# Patient Record
Sex: FEMALE | Race: WHITE | NOT HISPANIC OR LATINO | Employment: FULL TIME | URBAN - METROPOLITAN AREA
[De-identification: names, ages, dates, MRNs, and addresses within clinical notes are randomized per-mention and may not be internally consistent; named-entity substitution may affect disease eponyms.]

---

## 2022-11-08 DIAGNOSIS — M25.561 RIGHT KNEE PAIN, UNSPECIFIED CHRONICITY: Primary | ICD-10-CM

## 2022-12-15 ENCOUNTER — TRANSFERRED RECORDS (OUTPATIENT)
Dept: HEALTH INFORMATION MANAGEMENT | Facility: CLINIC | Age: 22
End: 2022-12-15

## 2023-03-07 ENCOUNTER — TELEPHONE (OUTPATIENT)
Dept: ORTHOPEDICS | Facility: CLINIC | Age: 23
End: 2023-03-07

## 2023-03-07 NOTE — TELEPHONE ENCOUNTER
Patient was called and scheduled for a virtual follow up with  on 3/21/23.  She had no other questions.

## 2023-03-21 ENCOUNTER — VIRTUAL VISIT (OUTPATIENT)
Dept: ORTHOPEDICS | Facility: CLINIC | Age: 23
End: 2023-03-21
Payer: COMMERCIAL

## 2023-03-21 DIAGNOSIS — M25.361 PATELLAR INSTABILITY OF RIGHT KNEE: Primary | ICD-10-CM

## 2023-03-21 PROCEDURE — 99204 OFFICE O/P NEW MOD 45 MIN: CPT | Mod: VID | Performed by: ORTHOPAEDIC SURGERY

## 2023-03-21 RX ORDER — BUDESONIDE AND FORMOTEROL FUMARATE DIHYDRATE 160; 4.5 UG/1; UG/1
2 AEROSOL RESPIRATORY (INHALATION) 2 TIMES DAILY
COMMUNITY
Start: 2022-11-08 | End: 2023-11-08

## 2023-03-21 NOTE — PROGRESS NOTES
"Reason For Visit:   Chief Complaint   Patient presents with     RECHECK     Follow up right knee. Review MRI  Call [phone]        Primary MD: Shantell Cooper  Referring MD: est    ?  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  Work status?  Full time.  Date of injury: Went to a wedding and was dancing in October 2022 and her knee went out.  Since then it has been monthly    Date of surgery: NA  Type of surgery: NA.  Smoker: No  Request smoking cessation information: No    There were no vitals taken for this visit.    Pain Assessment  Patient Currently in Pain: Yes  0-10 Pain Scale: 2  Primary Pain Location: Knee (Right)  Pain Descriptors: Sore  Alleviating Factors: Pain medication, Rest (tylenol)  Aggravating Factors: Movement, Walking, Stairs      Jordana is a 23 year old who is being evaluated via a billable video visit.  [phone]    How would you like to obtain your AVS? Mail a copy  If the video visit is dropped, the invitation should be resent by: Text to cell phone:   Will anyone else be joining your video visit? No        Video-Visit Details    Type of service:  Video Visit   1   Originating Location (pt. Location): Home    Distant Location (provider location):  Off-site  Platform used for Video Visit: TVSmiles     Video start time 10:46  Video end time: 11:17  Review of MRI and measuring anatomic risk factors: 15 minutes      Ksenia Marie MD  Professor Orthopedic Surgery  [facility]    Subjective    Patient is a 23-year-old female who last saw me in 2015.  At that time she was 1/th1th0th thgthrthathdthethrth in high school.  She presented with recurrent right patellar instability.  She was experiencing what she calls subluxations at that time without a bobo dislocation.  She was playing soccer.  She chose nonoperative management at that time.    The reason for return to seeing me is that she has been experiencing more episodes of what she calls \"subluxations\", " and more importantly she is falling with them.  They come instantly when she makes a turn while at work, or trying to play pickle ball.  At this point she is given up virtually all of her activities except biking.  The other thing that is happening is that her kneecap is catching before it comes back in.  It is always has come back and spontaneously.  She has swelling that is visible, and she has reduced motion.  This lasts a few days.  She believes this is happening at the moment approximately once a month.    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  She lives alone in an apartment but the apartment does have an elevator.  She last did physical therapy in 2020 which was done in [place].  She continues to do club room activities with weight machines.    In talking about knee motion she does feel that her kneecap is jumping as a goes into the groove in early flexion,  on her right side only and not her left.  This also is somewhat new.    Her only comorbidity is asthma for which she is medicated.  She has no DVT risk factors.    MRI was done in [place] and reviewed.  It is most significant for moderate cartilage wear along the medial half of the lateral patella facet starting at the median ridge and extending laterally.  It is on the inferior half only.    Other anatomic instability risk factors are as follows:  Lateral trochlear inclination angle 4 degrees  Lateral patella tilt 26 degrees  Sulcus angle 167 degrees  C/D1.1       I/S1.5  P TI 0.66  Boss:   6.3 mm    Physical exam done in 2015 showed mild knee hyperextension, less than 10 degrees    Assessment: Based on is well is on her current MRI, as well as her history, I believe that she should stabilize his kneecap.  This is because she is having increasing cartilage wear which is likely partly responsible for her swelling, and she is a fracture risk as she is currently falling when her kneecap gives way.    I would  recommend an MPFL reconstruction/lateral retinacular lengthening/trochlear plasty for the surgical procedure.    She lives alone in an apartment in [place], the apartment has an elevator.  However postoperatively she would plan on spending time with her parents in [place], perhaps for up to a month, and at that location she would go to [place] for her physical therapy with Calli.    She would like something done in the near future as she remains quite fearful of her knee as it has even gone out at work.    We will plan on having her see me down in the [facility] with surgery shortly thereafter.  She would likely stay at her parents house which is about 70 minutes north of the [facility].    She also would like the information to be able to sign into my chart.    I would also like to send her my patient information sheets.  This is for MPFL plus trochlear plasty plus surgical timeline for trochlear plasty procedure.  Email:  [email or web address]

## 2023-03-21 NOTE — LETTER
3/21/2023         RE: Jordana Kern  [address] [place] [address]        Dear Colleague,    Thank you for referring your patient, Jordana Kern, to the [facility] [place]. Please see a copy of my visit note below.    Reason For Visit:   Chief Complaint   Patient presents with     RECHECK     Follow up right knee. Review MRI  Call [phone]        Primary MD: Shantell Cooper  Referring MD: est    ?  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  Date of injury: Went to a wedding and was dancing in October 2022 and her knee went out.  Since then it has been monthly    Date of surgery: NA  Type of surgery: NA.  Smoker: No  Request smoking cessation information: No    There were no vitals taken for this visit.    Pain Assessment  Patient Currently in Pain: Yes  0-10 Pain Scale: 2  Primary Pain Location: Knee (Right)  Pain Descriptors: Sore  Alleviating Factors: Pain medication, Rest (tylenol)  Aggravating Factors: Movement, Walking, Stairs      Jordana is a 23 year old who is being evaluated via a billable video visit.  [phone]    How would you like to obtain your AVS? Mail a copy  If the video visit is dropped, the invitation should be resent by: Text to cell phone:   Will anyone else be joining your video visit? No        Video-Visit Details    Type of service:  Video Visit   1   Originating Location (pt. Location): Home    Distant Location (provider location):  Off-site  Platform used for Video Visit: XZERES     Video start time 10:46  Video end time: 11:17  Review of MRI and measuring anatomic risk factors: 15 minutes      Ksenia Marie MD  Professor Orthopedic Surgery  [facility]    Subjective    Patient is a 23-year-old female who last saw me in 2015.  At that time she was 1/th1th0th thgthrthathdthethrth in high school.  She presented with recurrent right patellar instability.  She was  "experiencing what she calls subluxations at that time without a bobo dislocation.  She was playing soccer.  She chose nonoperative management at that time.    The reason for return to seeing me is that she has been experiencing more episodes of what she calls \"subluxations\", and more importantly she is falling with them.  They come instantly when she makes a turn while at work, or trying to play pickle ball.  At this point she is given up virtually all of her activities except biking.  The other thing that is happening is that her kneecap is catching before it comes back in.  It is always has come back and spontaneously.  She has swelling that is visible, and she has reduced motion.  This lasts a few days.  She believes this is happening at the moment approximately once a month.    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  She lives alone in an apartment but the apartment does have an elevator.  She last did physical therapy in 2020 which was done in [place].  She continues to do club room activities with weight machines.    In talking about knee motion she does feel that her kneecap is jumping as a goes into the groove in early flexion,  on her right side only and not her left.  This also is somewhat new.    Her only comorbidity is asthma for which she is medicated.  She has no DVT risk factors.    MRI was done in [place] and reviewed.  It is most significant for moderate cartilage wear along the medial half of the lateral patella facet starting at the median ridge and extending laterally.  It is on the inferior half only.    Other anatomic instability risk factors are as follows:  Lateral trochlear inclination angle 4 degrees  Lateral patella tilt 26 degrees  Sulcus angle 167 degrees  C/D1.1       I/S1.5  P TI 0.66  Boss:   6.3 mm    Physical exam done in 2015 showed mild knee hyperextension, less than 10 degrees    Assessment: Based on is well is on her current MRI, as " well as her history, I believe that she should stabilize his kneecap.  This is because she is having increasing cartilage wear which is likely partly responsible for her swelling, and she is a fracture risk as she is currently falling when her kneecap gives way.    I would recommend an MPFL reconstruction/lateral retinacular lengthening/trochlear plasty for the surgical procedure.    She lives alone in an apartment in [place], the apartment has an elevator.  However postoperatively she would plan on spending time with her parents in [place], perhaps for up to a month, and at that location she would go to [facility] for her physical therapy with Calli.    She would like something done in the near future as she remains quite fearful of her knee as it has even gone out at work.    We will plan on having her see me down in the [facility] with surgery shortly thereafter.  She would likely stay at her parents house which is about 70 minutes north of the [facility].    She also would like the information to be able to sign into my chart.    I would also like to send her my patient information sheets.  This is for MPFL plus trochlear plasty plus surgical timeline for trochlear plasty procedure.  Email:  [email or web address]      Ksenia Marie MD

## 2023-03-22 NOTE — NURSING NOTE
Information sheet for MPFL and Trochleoplasty along with Trochleoplaty surgical timeline sheet was emailed to patient at the email in her chart.

## 2023-03-24 ENCOUNTER — TELEPHONE (OUTPATIENT)
Dept: ORTHOPEDICS | Facility: CLINIC | Age: 23
End: 2023-03-24
Payer: COMMERCIAL

## 2023-03-27 ENCOUNTER — TELEPHONE (OUTPATIENT)
Dept: ORTHOPEDICS | Facility: CLINIC | Age: 23
End: 2023-03-27
Payer: COMMERCIAL

## 2023-03-27 DIAGNOSIS — M25.361 PATELLAR INSTABILITY OF RIGHT KNEE: Primary | ICD-10-CM

## 2023-03-27 NOTE — TELEPHONE ENCOUNTER
Teaching Flowsheet   Relevant Diagnosis:   Teaching Topic: Right Medial patellofemoral ligament reconstuction with allograft,arthroscopy, trochleoplasty, possible lateral retinacular lengthening     Patient would like to participate in post op PT at [facility] in [facility], writer will fax orders and protocols.     Person(s) involved in teaching:   Patient     Motivation Level:  Asks Questions: Yes  Eager to Learn: Yes  Cooperative: Yes  Receptive (willing/able to accept information): Yes  Any cultural factors/[employer] beliefs that may influence understanding or compliance? No  Comments: none     Patient demonstrates understanding of the following:  Reason for the appointment, diagnosis and treatment plan: Yes  Knowledge of proper use of medications and conditions for which they are ordered (with special attention to potential side effects or drug interactions): Yes  Which situations necessitate calling provider and whom to contact: Yes       Teaching Concerns Addressed:   Comments: none     Proper use and care of (medical equip, care aids, etc.): Yes  Nutritional needs and diet plan: Yes  Pain management techniques: Yes  Wound Care: Yes  How and/when to access community resources: Yes     Instructional Materials Used/Given: surgery packet mailed by Jyothi (patient told to call us if not received by end of week), chlorhexidine, stoplight tool     Time spent with patient: 15 minutes.    Lindsey Herrera RN on 3/27/2023 at 3:05 PM

## 2023-03-31 DIAGNOSIS — M25.361 PATELLAR INSTABILITY OF RIGHT KNEE: Primary | ICD-10-CM

## 2023-04-04 ENCOUNTER — MEDICAL CORRESPONDENCE (OUTPATIENT)
Dept: HEALTH INFORMATION MANAGEMENT | Facility: CLINIC | Age: 23
End: 2023-04-04

## 2023-04-04 ENCOUNTER — OFFICE VISIT (OUTPATIENT)
Dept: ORTHOPEDICS | Facility: CLINIC | Age: 23
End: 2023-04-04
Payer: COMMERCIAL

## 2023-04-04 ENCOUNTER — ANCILLARY PROCEDURE (OUTPATIENT)
Dept: CT IMAGING | Facility: CLINIC | Age: 23
End: 2023-04-04
Attending: ORTHOPAEDIC SURGERY
Payer: COMMERCIAL

## 2023-04-04 ENCOUNTER — ANCILLARY PROCEDURE (OUTPATIENT)
Dept: GENERAL RADIOLOGY | Facility: CLINIC | Age: 23
End: 2023-04-04
Attending: ORTHOPAEDIC SURGERY
Payer: COMMERCIAL

## 2023-04-04 VITALS — WEIGHT: 122.8 LBS | BODY MASS INDEX: 23.18 KG/M2 | HEIGHT: 61 IN

## 2023-04-04 DIAGNOSIS — M25.361 PATELLAR INSTABILITY OF RIGHT KNEE: Primary | ICD-10-CM

## 2023-04-04 DIAGNOSIS — M25.361 PATELLAR INSTABILITY OF RIGHT KNEE: ICD-10-CM

## 2023-04-04 PROCEDURE — 73700 CT LOWER EXTREMITY W/O DYE: CPT | Mod: RT | Performed by: RADIOLOGY

## 2023-04-04 PROCEDURE — 73560 X-RAY EXAM OF KNEE 1 OR 2: CPT | Mod: RT | Performed by: RADIOLOGY

## 2023-04-04 PROCEDURE — 99214 OFFICE O/P EST MOD 30 MIN: CPT | Performed by: ORTHOPAEDIC SURGERY

## 2023-04-04 PROCEDURE — 77073 BONE LENGTH STUDIES: CPT | Performed by: SURGERY

## 2023-04-04 NOTE — NURSING NOTE
"Reason For Visit:   Chief Complaint   Patient presents with     RECHECK     Follow up Right patellar instability.  Discuss surgery DOS: 4/13/23 right MPFL trochleoplasty and possible LRL         Primary MD: Shantell Cooper  Referring MD: est     ?  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  Work status?  Full time.  Date of injury: Went to a wedding and was dancing in October 2022 and her knee went out.  Since then it has been monthly     Date of surgery: NA  Type of surgery: NA.  Smoker: No  Request smoking cessation information: No    Ht 1.56 m (5' 1.42\")   Wt 55.7 kg (122 lb 12.8 oz)   BMI 22.89 kg/m      Pain Assessment  Patient Currently in Pain: Denies    "

## 2023-04-04 NOTE — PROGRESS NOTES
Patient is a 23-year-old female who I have seen once virtually and is here onsite.  She is currently a 23-year-old female who I saw when she was in the 11th grade.  At that time she presented with recurrent right patella instability.  At that time surgical options were presented to her and she opted to continue to treat this nonoperatively    In high school she did well.  She was able to play sports in particular soccer..  She has not had numerous bobo dislocating events since that time but she is experiencing what she calls subluxations.  These have been getting more frequent over time or at least she is noticing them more.  They at times create swelling and pain.    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  She has had 5+ surgeries on her left foot but was able to be fairly active in high school and continues with walking and sporting activities.    Her parents live north of [place].  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  She is here for surgical consultation.  Med list was reviewed.  She has no DVT risk factors.    Physical exam reveals a thin woman, BMI compute to 23.  Examination of patient's bilateral hips show asymmetry.  Right side internal rotation to 45 degrees external rotation to 30 left side internal rotation 15 degrees external rotation to 45.  This is without pain    Examination of patient's left nonaffected knee reveals full range of motion 0-1 48.  Kneecap tracks satisfactorily.  No crepitus.  Passive patella mobility 1 quadrant medial translation, 2 quadrants lateral translation.  Both with a firm endpoint.  No apprehension    Examination of patient's right affected knee reveals fullness about the knee without a bobo fluid wave suggestive of synovitis.  Range of motion 0-1 48.  Tracking reveals soft J sign but relocation with crepitus.  Passive patella mobility 1 quadrant medial translation 2+ quadrant lateral  translation.  Positive apprehension sign.    No suggestion of tibial  torsion by exam    Imaging: Long-leg alignment films reveal pelvic tilt with leg length discrepancy measuring approximately 5 mm.  Asymmetry in hip shape with with femoral neck angle 121 right side, 138 left side  Suggestion of limb version by 2 plain films.  Neutral mechanical axis both sides.    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  Left femoral anteversion is 27 degrees.    Tibial torsion on the right is 26 degrees.  Tibial torsion on the left is 28 degrees.    Tibial Tuberosity to Trochlear groove distance: Her right one is  Right: 13 mm.  Left: 10 mm.    Femoral Tibial Rotation:  Right tibia is 9 degrees rotated externally relative to femur.  Left tibia is 9 degrees rotated externally relative to femur.    Impression: 1.  Recurrent right patellar instability with positive J sign  2.  Imaging most significant for a positive boss and no patella alto and no increased lateral tubercle offset  3.  Limb version below the surgical threshold    Plan: Surgical discussion was had including MPFL reconstruction/LRL/trochlear plasty.  She understands this is an outpatient procedure, she will stay overnight as needed.  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  She already has physical therapy set up with Calli at [facility] in [place]    All questions were answered.    Ksenia Marie MD  Professor Orthopedic Surgery  [facility]

## 2023-04-04 NOTE — LETTER
4/4/2023         RE: Jordana Kern  [address] [place] [address]        Dear Colleague,    Thank you for referring your patient, Jordana Kern, to the [facility] [place]. Please see a copy of my visit note below.    Patient is a 23-year-old female who I have seen once virtually and is here onsite.  She is currently a 23-year-old female who I saw when she was in the 11th grade.  At that time she presented with recurrent right patella instability.  At that time surgical options were presented to her and she opted to continue to treat this nonoperatively    In high school she did well.  She was able to play sports in particular soccer..  She has not had numerous bobo dislocating events since that time but she is experiencing what she calls subluxations.  These have been getting more frequent over time or at least she is noticing them more.  They at times create swelling and pain.    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  She has had 5+ surgeries on her left foot but was able to be fairly active in high school and continues with walking and sporting activities.    Her [place] live [place] [facility].  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  She is here for surgical consultation.  Med list was reviewed.  She has no DVT risk factors.    Physical exam reveals a thin woman, BMI compute to 23.  Examination of patient's bilateral hips show asymmetry.  Right side internal rotation to 45 degrees external rotation to 30 left side internal rotation 15 degrees external rotation to 45.  This is without pain    Examination of patient's left nonaffected knee reveals full range of motion 0-1 48.  Kneecap tracks satisfactorily.  No crepitus.  Passive patella mobility 1 quadrant medial translation, 2 quadrants lateral translation.  Both with a firm endpoint.  No apprehension    Examination of patient's  right affected knee reveals fullness about the knee without a bobo fluid wave suggestive of synovitis.  Range of motion 0-1 48.  Tracking reveals soft J sign but relocation with crepitus.  Passive patella mobility 1 quadrant medial translation 2+ quadrant lateral translation.  Positive apprehension sign.    No suggestion of tibial  torsion by exam    Imaging: Long-leg alignment films reveal pelvic tilt with leg length discrepancy measuring approximately 5 mm.  Asymmetry in hip shape with with femoral neck angle 121 right side, 138 left side  Suggestion of limb version by 2 plain films.  Neutral mechanical axis both sides.    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  Left femoral anteversion is 27 degrees.    Tibial torsion on the right is 26 degrees.  Tibial torsion on the left is 28 degrees.    Tibial Tuberosity to Trochlear groove distance: Her right one is  Right: 13 mm.  Left: 10 mm.    Femoral Tibial Rotation:  Right tibia is 9 degrees rotated externally relative to femur.  Left tibia is 9 degrees rotated externally relative to femur.    Impression: 1.  Recurrent right patellar instability with positive J sign  2.  Imaging most significant for a positive boss and no patella alto and no increased lateral tubercle offset  3.  Limb version below the surgical threshold    Plan: Surgical discussion was had including MPFL reconstruction/LRL/trochlear plasty.  She understands this is an outpatient procedure, she will stay overnight as needed.  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  She already has physical therapy set up with Calli at [facility] in [place]    All questions were answered.    Ksenia Marie MD  Professor Orthopedic Surgery  [facility]

## 2023-04-12 ENCOUNTER — ANESTHESIA EVENT (OUTPATIENT)
Dept: SURGERY | Facility: CLINIC | Age: 23
End: 2023-04-12
Payer: COMMERCIAL

## 2023-04-13 ENCOUNTER — ANESTHESIA (OUTPATIENT)
Dept: SURGERY | Facility: CLINIC | Age: 23
End: 2023-04-13
Payer: COMMERCIAL

## 2023-04-13 ENCOUNTER — APPOINTMENT (OUTPATIENT)
Dept: GENERAL RADIOLOGY | Facility: CLINIC | Age: 23
End: 2023-04-13
Attending: ORTHOPAEDIC SURGERY
Payer: COMMERCIAL

## 2023-04-13 ENCOUNTER — HOSPITAL ENCOUNTER (OUTPATIENT)
Facility: CLINIC | Age: 23
Discharge: HOME | End: 2023-04-14
Attending: ORTHOPAEDIC SURGERY | Admitting: ORTHOPAEDIC SURGERY
Payer: COMMERCIAL

## 2023-04-13 DIAGNOSIS — M25.361 PATELLAR INSTABILITY OF RIGHT KNEE: Primary | ICD-10-CM

## 2023-04-13 DIAGNOSIS — Z98.890 S/P RIGHT KNEE SURGERY: ICD-10-CM

## 2023-04-13 PROCEDURE — 250N000011 HC RX IP 250 OP 636

## 2023-04-13 PROCEDURE — 250N000011 HC RX IP 250 OP 636: Performed by: ANESTHESIOLOGY

## 2023-04-13 PROCEDURE — 999N000141 HC STATISTIC PRE-PROCEDURE NURSING ASSESSMENT: Performed by: ORTHOPAEDIC SURGERY

## 2023-04-13 PROCEDURE — 250N000009 HC RX 250

## 2023-04-13 PROCEDURE — 250N000011 HC RX IP 250 OP 636: Performed by: PHYSICIAN ASSISTANT

## 2023-04-13 PROCEDURE — 258N000001 HC RX 258: Performed by: ORTHOPAEDIC SURGERY

## 2023-04-13 PROCEDURE — C1762 CONN TISS, HUMAN(INC FASCIA): HCPCS | Performed by: ORTHOPAEDIC SURGERY

## 2023-04-13 PROCEDURE — 360N000084 HC SURGERY LEVEL 4 W/ FLUORO, PER MIN: Performed by: ORTHOPAEDIC SURGERY

## 2023-04-13 PROCEDURE — 27450 INCISION OF THIGH: CPT | Mod: RT | Performed by: ORTHOPAEDIC SURGERY

## 2023-04-13 PROCEDURE — 370N000017 HC ANESTHESIA TECHNICAL FEE, PER MIN: Performed by: ORTHOPAEDIC SURGERY

## 2023-04-13 PROCEDURE — 27427 RECONSTRUCTION KNEE: CPT | Mod: RT | Performed by: ORTHOPAEDIC SURGERY

## 2023-04-13 PROCEDURE — 250N000013 HC RX MED GY IP 250 OP 250 PS 637: Performed by: PHYSICIAN ASSISTANT

## 2023-04-13 PROCEDURE — 250N000011 HC RX IP 250 OP 636: Performed by: ORTHOPAEDIC SURGERY

## 2023-04-13 PROCEDURE — 250N000009 HC RX 250: Performed by: ORTHOPAEDIC SURGERY

## 2023-04-13 PROCEDURE — 258N000003 HC RX IP 258 OP 636

## 2023-04-13 PROCEDURE — 999N000180 XR SURGERY CARM FLUORO LESS THAN 5 MIN: Mod: TC

## 2023-04-13 PROCEDURE — 272N000001 HC OR GENERAL SUPPLY STERILE: Performed by: ORTHOPAEDIC SURGERY

## 2023-04-13 PROCEDURE — 710N000010 HC RECOVERY PHASE 1, LEVEL 2, PER MIN: Performed by: ORTHOPAEDIC SURGERY

## 2023-04-13 PROCEDURE — 258N000003 HC RX IP 258 OP 636: Performed by: PHYSICIAN ASSISTANT

## 2023-04-13 PROCEDURE — C1713 ANCHOR/SCREW BN/BN,TIS/BN: HCPCS | Performed by: ORTHOPAEDIC SURGERY

## 2023-04-13 DEVICE — BIO-COMP SWVLK 3.5X 15.8MM
Type: IMPLANTABLE DEVICE | Site: KNEE | Status: FUNCTIONAL
Brand: ARTHREX®

## 2023-04-13 DEVICE — GRAFT TENDON GRACILIS 430300: Type: IMPLANTABLE DEVICE | Site: KNEE | Status: FUNCTIONAL

## 2023-04-13 RX ORDER — PROPOFOL 10 MG/ML
INJECTION, EMULSION INTRAVENOUS CONTINUOUS PRN
Status: DISCONTINUED | OUTPATIENT
Start: 2023-04-13 | End: 2023-04-13

## 2023-04-13 RX ORDER — ACETAMINOPHEN 325 MG/1
975 TABLET ORAL EVERY 8 HOURS
Status: DISCONTINUED | OUTPATIENT
Start: 2023-04-13 | End: 2023-04-14 | Stop reason: HOSPADM

## 2023-04-13 RX ORDER — AMOXICILLIN 250 MG
1 CAPSULE ORAL 2 TIMES DAILY
Status: DISCONTINUED | OUTPATIENT
Start: 2023-04-13 | End: 2023-04-14 | Stop reason: HOSPADM

## 2023-04-13 RX ORDER — PROPOFOL 10 MG/ML
INJECTION, EMULSION INTRAVENOUS PRN
Status: DISCONTINUED | OUTPATIENT
Start: 2023-04-13 | End: 2023-04-13

## 2023-04-13 RX ORDER — SODIUM CHLORIDE, SODIUM LACTATE, POTASSIUM CHLORIDE, CALCIUM CHLORIDE 600; 310; 30; 20 MG/100ML; MG/100ML; MG/100ML; MG/100ML
INJECTION, SOLUTION INTRAVENOUS CONTINUOUS
Status: DISCONTINUED | OUTPATIENT
Start: 2023-04-13 | End: 2023-04-13

## 2023-04-13 RX ORDER — METHOCARBAMOL 750 MG/1
750 TABLET, FILM COATED ORAL EVERY 6 HOURS PRN
Status: DISCONTINUED | OUTPATIENT
Start: 2023-04-13 | End: 2023-04-14 | Stop reason: HOSPADM

## 2023-04-13 RX ORDER — BUDESONIDE AND FORMOTEROL FUMARATE DIHYDRATE 160; 4.5 UG/1; UG/1
1 AEROSOL RESPIRATORY (INHALATION) DAILY
Status: DISCONTINUED | OUTPATIENT
Start: 2023-04-14 | End: 2023-04-13

## 2023-04-13 RX ORDER — GLYCOPYRROLATE 0.2 MG/ML
INJECTION, SOLUTION INTRAMUSCULAR; INTRAVENOUS PRN
Status: DISCONTINUED | OUTPATIENT
Start: 2023-04-13 | End: 2023-04-13

## 2023-04-13 RX ORDER — NALOXONE HYDROCHLORIDE 0.4 MG/ML
0.4 INJECTION, SOLUTION INTRAMUSCULAR; INTRAVENOUS; SUBCUTANEOUS
Status: DISCONTINUED | OUTPATIENT
Start: 2023-04-13 | End: 2023-04-14 | Stop reason: HOSPADM

## 2023-04-13 RX ORDER — BUPIVACAINE HYDROCHLORIDE 2.5 MG/ML
INJECTION, SOLUTION INFILTRATION; PERINEURAL PRN
Status: DISCONTINUED | OUTPATIENT
Start: 2023-04-13 | End: 2023-04-13 | Stop reason: HOSPADM

## 2023-04-13 RX ORDER — CEFAZOLIN SODIUM/WATER 2 G/20 ML
2 SYRINGE (ML) INTRAVENOUS SEE ADMIN INSTRUCTIONS
Status: DISCONTINUED | OUTPATIENT
Start: 2023-04-13 | End: 2023-04-13 | Stop reason: HOSPADM

## 2023-04-13 RX ORDER — ONDANSETRON 4 MG/1
4 TABLET, ORALLY DISINTEGRATING ORAL EVERY 30 MIN PRN
Status: DISCONTINUED | OUTPATIENT
Start: 2023-04-13 | End: 2023-04-13

## 2023-04-13 RX ORDER — ONDANSETRON 2 MG/ML
4 INJECTION INTRAMUSCULAR; INTRAVENOUS EVERY 30 MIN PRN
Status: DISCONTINUED | OUTPATIENT
Start: 2023-04-13 | End: 2023-04-13

## 2023-04-13 RX ORDER — TRANEXAMIC ACID 650 MG/1
1950 TABLET ORAL ONCE
Status: COMPLETED | OUTPATIENT
Start: 2023-04-13 | End: 2023-04-13

## 2023-04-13 RX ORDER — DEXMEDETOMIDINE HYDROCHLORIDE 4 UG/ML
INJECTION, SOLUTION INTRAVENOUS
Status: COMPLETED | OUTPATIENT
Start: 2023-04-13 | End: 2023-04-13

## 2023-04-13 RX ORDER — BUPIVACAINE HYDROCHLORIDE 2.5 MG/ML
INJECTION, SOLUTION EPIDURAL; INFILTRATION; INTRACAUDAL
Status: COMPLETED | OUTPATIENT
Start: 2023-04-13 | End: 2023-04-13

## 2023-04-13 RX ORDER — DEXAMETHASONE SODIUM PHOSPHATE 4 MG/ML
INJECTION, SOLUTION INTRA-ARTICULAR; INTRALESIONAL; INTRAMUSCULAR; INTRAVENOUS; SOFT TISSUE PRN
Status: DISCONTINUED | OUTPATIENT
Start: 2023-04-13 | End: 2023-04-13

## 2023-04-13 RX ORDER — FENTANYL CITRATE 50 UG/ML
25-50 INJECTION, SOLUTION INTRAMUSCULAR; INTRAVENOUS
Status: DISCONTINUED | OUTPATIENT
Start: 2023-04-13 | End: 2023-04-13 | Stop reason: HOSPADM

## 2023-04-13 RX ORDER — ASPIRIN 81 MG/1
81 TABLET ORAL 2 TIMES DAILY
Status: DISCONTINUED | OUTPATIENT
Start: 2023-04-13 | End: 2023-04-14 | Stop reason: HOSPADM

## 2023-04-13 RX ORDER — CEFAZOLIN SODIUM 1 G/3ML
1 INJECTION, POWDER, FOR SOLUTION INTRAMUSCULAR; INTRAVENOUS EVERY 8 HOURS
Status: COMPLETED | OUTPATIENT
Start: 2023-04-13 | End: 2023-04-14

## 2023-04-13 RX ORDER — ONDANSETRON 4 MG/1
4 TABLET, ORALLY DISINTEGRATING ORAL EVERY 6 HOURS PRN
Status: DISCONTINUED | OUTPATIENT
Start: 2023-04-13 | End: 2023-04-14 | Stop reason: HOSPADM

## 2023-04-13 RX ORDER — LIDOCAINE 40 MG/G
CREAM TOPICAL
Status: DISCONTINUED | OUTPATIENT
Start: 2023-04-13 | End: 2023-04-14 | Stop reason: HOSPADM

## 2023-04-13 RX ORDER — NALOXONE HYDROCHLORIDE 0.4 MG/ML
0.4 INJECTION, SOLUTION INTRAMUSCULAR; INTRAVENOUS; SUBCUTANEOUS
Status: DISCONTINUED | OUTPATIENT
Start: 2023-04-13 | End: 2023-04-13 | Stop reason: HOSPADM

## 2023-04-13 RX ORDER — ACETAMINOPHEN 325 MG/1
650 TABLET ORAL EVERY 4 HOURS PRN
Status: DISCONTINUED | OUTPATIENT
Start: 2023-04-16 | End: 2023-04-14 | Stop reason: HOSPADM

## 2023-04-13 RX ORDER — ACETAMINOPHEN 325 MG/1
650 TABLET ORAL EVERY 4 HOURS PRN
Qty: 100 TABLET | Refills: 0 | Status: SHIPPED | OUTPATIENT
Start: 2023-04-13

## 2023-04-13 RX ORDER — FENTANYL CITRATE 50 UG/ML
50 INJECTION, SOLUTION INTRAMUSCULAR; INTRAVENOUS EVERY 5 MIN PRN
Status: DISCONTINUED | OUTPATIENT
Start: 2023-04-13 | End: 2023-04-13

## 2023-04-13 RX ORDER — NALOXONE HYDROCHLORIDE 0.4 MG/ML
0.2 INJECTION, SOLUTION INTRAMUSCULAR; INTRAVENOUS; SUBCUTANEOUS
Status: DISCONTINUED | OUTPATIENT
Start: 2023-04-13 | End: 2023-04-13 | Stop reason: HOSPADM

## 2023-04-13 RX ORDER — SODIUM CHLORIDE, SODIUM LACTATE, POTASSIUM CHLORIDE, CALCIUM CHLORIDE 600; 310; 30; 20 MG/100ML; MG/100ML; MG/100ML; MG/100ML
INJECTION, SOLUTION INTRAVENOUS CONTINUOUS
Status: DISCONTINUED | OUTPATIENT
Start: 2023-04-13 | End: 2023-04-14 | Stop reason: HOSPADM

## 2023-04-13 RX ORDER — SODIUM CHLORIDE, SODIUM LACTATE, POTASSIUM CHLORIDE, CALCIUM CHLORIDE 600; 310; 30; 20 MG/100ML; MG/100ML; MG/100ML; MG/100ML
INJECTION, SOLUTION INTRAVENOUS CONTINUOUS PRN
Status: DISCONTINUED | OUTPATIENT
Start: 2023-04-13 | End: 2023-04-13

## 2023-04-13 RX ORDER — EPHEDRINE SULFATE 50 MG/ML
INJECTION, SOLUTION INTRAMUSCULAR; INTRAVENOUS; SUBCUTANEOUS PRN
Status: DISCONTINUED | OUTPATIENT
Start: 2023-04-13 | End: 2023-04-13

## 2023-04-13 RX ORDER — FLUMAZENIL 0.1 MG/ML
0.2 INJECTION, SOLUTION INTRAVENOUS
Status: DISCONTINUED | OUTPATIENT
Start: 2023-04-13 | End: 2023-04-13 | Stop reason: HOSPADM

## 2023-04-13 RX ORDER — BISACODYL 10 MG
10 SUPPOSITORY, RECTAL RECTAL DAILY PRN
Status: DISCONTINUED | OUTPATIENT
Start: 2023-04-13 | End: 2023-04-14 | Stop reason: HOSPADM

## 2023-04-13 RX ORDER — HYDROMORPHONE HYDROCHLORIDE 1 MG/ML
0.4 INJECTION, SOLUTION INTRAMUSCULAR; INTRAVENOUS; SUBCUTANEOUS
Status: DISCONTINUED | OUTPATIENT
Start: 2023-04-13 | End: 2023-04-14 | Stop reason: HOSPADM

## 2023-04-13 RX ORDER — OXYCODONE HYDROCHLORIDE 5 MG/1
5 TABLET ORAL EVERY 4 HOURS PRN
Status: DISCONTINUED | OUTPATIENT
Start: 2023-04-13 | End: 2023-04-14 | Stop reason: HOSPADM

## 2023-04-13 RX ORDER — ALBUTEROL SULFATE 90 UG/1
2 AEROSOL, METERED RESPIRATORY (INHALATION) EVERY 6 HOURS PRN
COMMUNITY

## 2023-04-13 RX ORDER — HYDROMORPHONE HYDROCHLORIDE 1 MG/ML
0.2 INJECTION, SOLUTION INTRAMUSCULAR; INTRAVENOUS; SUBCUTANEOUS
Status: DISCONTINUED | OUTPATIENT
Start: 2023-04-13 | End: 2023-04-14 | Stop reason: HOSPADM

## 2023-04-13 RX ORDER — ONDANSETRON 2 MG/ML
4 INJECTION INTRAMUSCULAR; INTRAVENOUS EVERY 6 HOURS PRN
Status: DISCONTINUED | OUTPATIENT
Start: 2023-04-13 | End: 2023-04-14 | Stop reason: HOSPADM

## 2023-04-13 RX ORDER — CEFAZOLIN SODIUM/WATER 2 G/20 ML
2 SYRINGE (ML) INTRAVENOUS
Status: COMPLETED | OUTPATIENT
Start: 2023-04-13 | End: 2023-04-13

## 2023-04-13 RX ORDER — DEXAMETHASONE SODIUM PHOSPHATE 10 MG/ML
INJECTION, SOLUTION INTRAMUSCULAR; INTRAVENOUS
Status: COMPLETED | OUTPATIENT
Start: 2023-04-13 | End: 2023-04-13

## 2023-04-13 RX ORDER — FENTANYL CITRATE 50 UG/ML
25 INJECTION, SOLUTION INTRAMUSCULAR; INTRAVENOUS EVERY 5 MIN PRN
Status: DISCONTINUED | OUTPATIENT
Start: 2023-04-13 | End: 2023-04-13

## 2023-04-13 RX ORDER — ONDANSETRON 2 MG/ML
INJECTION INTRAMUSCULAR; INTRAVENOUS PRN
Status: DISCONTINUED | OUTPATIENT
Start: 2023-04-13 | End: 2023-04-13

## 2023-04-13 RX ORDER — POLYETHYLENE GLYCOL 3350 17 G/17G
17 POWDER, FOR SOLUTION ORAL DAILY
Status: DISCONTINUED | OUTPATIENT
Start: 2023-04-14 | End: 2023-04-14 | Stop reason: HOSPADM

## 2023-04-13 RX ORDER — LIDOCAINE HYDROCHLORIDE 20 MG/ML
INJECTION, SOLUTION INFILTRATION; PERINEURAL PRN
Status: DISCONTINUED | OUTPATIENT
Start: 2023-04-13 | End: 2023-04-13

## 2023-04-13 RX ORDER — BUPIVACAINE HYDROCHLORIDE 7.5 MG/ML
INJECTION, SOLUTION INTRASPINAL
Status: COMPLETED | OUTPATIENT
Start: 2023-04-13 | End: 2023-04-13

## 2023-04-13 RX ORDER — NALOXONE HYDROCHLORIDE 0.4 MG/ML
0.2 INJECTION, SOLUTION INTRAMUSCULAR; INTRAVENOUS; SUBCUTANEOUS
Status: DISCONTINUED | OUTPATIENT
Start: 2023-04-13 | End: 2023-04-14 | Stop reason: HOSPADM

## 2023-04-13 RX ORDER — OXYCODONE HYDROCHLORIDE 5 MG/1
5-10 TABLET ORAL EVERY 4 HOURS PRN
Qty: 26 TABLET | Refills: 0 | Status: SHIPPED | OUTPATIENT
Start: 2023-04-13

## 2023-04-13 RX ORDER — AMOXICILLIN 250 MG
1-2 CAPSULE ORAL 2 TIMES DAILY
Qty: 30 TABLET | Refills: 0 | Status: SHIPPED | OUTPATIENT
Start: 2023-04-13

## 2023-04-13 RX ORDER — ASPIRIN 81 MG/1
81 TABLET ORAL 2 TIMES DAILY WITH MEALS
Qty: 60 TABLET | Refills: 0 | Status: SHIPPED | OUTPATIENT
Start: 2023-04-13

## 2023-04-13 RX ORDER — OXYCODONE HYDROCHLORIDE 10 MG/1
10 TABLET ORAL EVERY 4 HOURS PRN
Status: DISCONTINUED | OUTPATIENT
Start: 2023-04-13 | End: 2023-04-14 | Stop reason: HOSPADM

## 2023-04-13 RX ORDER — ACETAMINOPHEN 325 MG/1
975 TABLET ORAL ONCE
Status: COMPLETED | OUTPATIENT
Start: 2023-04-13 | End: 2023-04-13

## 2023-04-13 RX ORDER — HYDROMORPHONE HYDROCHLORIDE 1 MG/ML
0.4 INJECTION, SOLUTION INTRAMUSCULAR; INTRAVENOUS; SUBCUTANEOUS EVERY 5 MIN PRN
Status: DISCONTINUED | OUTPATIENT
Start: 2023-04-13 | End: 2023-04-13

## 2023-04-13 RX ORDER — MAGNESIUM HYDROXIDE 1200 MG/15ML
LIQUID ORAL PRN
Status: DISCONTINUED | OUTPATIENT
Start: 2023-04-13 | End: 2023-04-13 | Stop reason: HOSPADM

## 2023-04-13 RX ORDER — FLUTICASONE FUROATE AND VILANTEROL 200; 25 UG/1; UG/1
1 POWDER RESPIRATORY (INHALATION) DAILY
Status: DISCONTINUED | OUTPATIENT
Start: 2023-04-14 | End: 2023-04-14 | Stop reason: HOSPADM

## 2023-04-13 RX ORDER — HYDROMORPHONE HYDROCHLORIDE 1 MG/ML
0.2 INJECTION, SOLUTION INTRAMUSCULAR; INTRAVENOUS; SUBCUTANEOUS EVERY 5 MIN PRN
Status: DISCONTINUED | OUTPATIENT
Start: 2023-04-13 | End: 2023-04-13

## 2023-04-13 RX ADMIN — GLYCOPYRROLATE 0.1 MG: 0.2 INJECTION, SOLUTION INTRAMUSCULAR; INTRAVENOUS at 11:35

## 2023-04-13 RX ADMIN — FENTANYL CITRATE 50 MCG: 50 INJECTION, SOLUTION INTRAMUSCULAR; INTRAVENOUS at 10:33

## 2023-04-13 RX ADMIN — ONDANSETRON 4 MG: 2 INJECTION INTRAMUSCULAR; INTRAVENOUS at 11:13

## 2023-04-13 RX ADMIN — FENTANYL CITRATE 25 MCG: 50 INJECTION, SOLUTION INTRAMUSCULAR; INTRAVENOUS at 13:28

## 2023-04-13 RX ADMIN — PROPOFOL 30 MG: 10 INJECTION, EMULSION INTRAVENOUS at 15:00

## 2023-04-13 RX ADMIN — PROPOFOL 20 MG: 10 INJECTION, EMULSION INTRAVENOUS at 11:27

## 2023-04-13 RX ADMIN — BUPIVACAINE HYDROCHLORIDE 20 ML: 2.5 INJECTION, SOLUTION EPIDURAL; INFILTRATION; INTRACAUDAL at 10:30

## 2023-04-13 RX ADMIN — DEXAMETHASONE SODIUM PHOSPHATE 4 MG: 4 INJECTION, SOLUTION INTRA-ARTICULAR; INTRALESIONAL; INTRAMUSCULAR; INTRAVENOUS; SOFT TISSUE at 11:32

## 2023-04-13 RX ADMIN — Medication 5 MG: at 11:46

## 2023-04-13 RX ADMIN — ASPIRIN 81 MG: 81 TABLET, COATED ORAL at 20:36

## 2023-04-13 RX ADMIN — SENNOSIDES AND DOCUSATE SODIUM 1 TABLET: 50; 8.6 TABLET ORAL at 20:36

## 2023-04-13 RX ADMIN — PROPOFOL 40 MG: 10 INJECTION, EMULSION INTRAVENOUS at 11:25

## 2023-04-13 RX ADMIN — PROPOFOL 150 MCG/KG/MIN: 10 INJECTION, EMULSION INTRAVENOUS at 11:26

## 2023-04-13 RX ADMIN — SODIUM CHLORIDE, POTASSIUM CHLORIDE, SODIUM LACTATE AND CALCIUM CHLORIDE: 600; 310; 30; 20 INJECTION, SOLUTION INTRAVENOUS at 20:37

## 2023-04-13 RX ADMIN — OXYCODONE HYDROCHLORIDE 5 MG: 5 TABLET ORAL at 18:13

## 2023-04-13 RX ADMIN — SODIUM CHLORIDE, POTASSIUM CHLORIDE, SODIUM LACTATE AND CALCIUM CHLORIDE: 600; 310; 30; 20 INJECTION, SOLUTION INTRAVENOUS at 12:30

## 2023-04-13 RX ADMIN — DEXMEDETOMIDINE 20 MCG: 100 INJECTION, SOLUTION, CONCENTRATE INTRAVENOUS at 10:30

## 2023-04-13 RX ADMIN — FENTANYL CITRATE 25 MCG: 50 INJECTION, SOLUTION INTRAMUSCULAR; INTRAVENOUS at 17:21

## 2023-04-13 RX ADMIN — MIDAZOLAM 1 MG: 1 INJECTION INTRAMUSCULAR; INTRAVENOUS at 11:24

## 2023-04-13 RX ADMIN — MIDAZOLAM 1 MG: 1 INJECTION INTRAMUSCULAR; INTRAVENOUS at 12:20

## 2023-04-13 RX ADMIN — LIDOCAINE HYDROCHLORIDE 50 MG: 20 INJECTION, SOLUTION INFILTRATION; PERINEURAL at 11:24

## 2023-04-13 RX ADMIN — ACETAMINOPHEN 975 MG: 325 TABLET ORAL at 09:34

## 2023-04-13 RX ADMIN — FENTANYL CITRATE 25 MCG: 50 INJECTION, SOLUTION INTRAMUSCULAR; INTRAVENOUS at 11:24

## 2023-04-13 RX ADMIN — Medication 2 G: at 11:23

## 2023-04-13 RX ADMIN — MIDAZOLAM 1.5 MG: 1 INJECTION INTRAMUSCULAR; INTRAVENOUS at 10:33

## 2023-04-13 RX ADMIN — SODIUM CHLORIDE, POTASSIUM CHLORIDE, SODIUM LACTATE AND CALCIUM CHLORIDE: 600; 310; 30; 20 INJECTION, SOLUTION INTRAVENOUS at 11:00

## 2023-04-13 RX ADMIN — ONDANSETRON 4 MG: 2 INJECTION INTRAMUSCULAR; INTRAVENOUS at 14:58

## 2023-04-13 RX ADMIN — BUPIVACAINE HYDROCHLORIDE IN DEXTROSE 1.6 ML: 7.5 INJECTION, SOLUTION SUBARACHNOID at 11:30

## 2023-04-13 RX ADMIN — Medication 5 MG: at 11:38

## 2023-04-13 RX ADMIN — DEXAMETHASONE SODIUM PHOSPHATE 2 MG: 10 INJECTION, SOLUTION INTRAMUSCULAR; INTRAVENOUS at 10:30

## 2023-04-13 RX ADMIN — PROPOFOL 40 MG: 10 INJECTION, EMULSION INTRAVENOUS at 12:20

## 2023-04-13 RX ADMIN — FENTANYL CITRATE 25 MCG: 50 INJECTION, SOLUTION INTRAMUSCULAR; INTRAVENOUS at 15:10

## 2023-04-13 RX ADMIN — TRANEXAMIC ACID 1950 MG: 650 TABLET ORAL at 09:35

## 2023-04-13 RX ADMIN — FENTANYL CITRATE 75 MCG: 50 INJECTION, SOLUTION INTRAMUSCULAR; INTRAVENOUS at 12:20

## 2023-04-13 RX ADMIN — CEFAZOLIN SODIUM 1 G: 1 INJECTION, POWDER, FOR SOLUTION INTRAMUSCULAR; INTRAVENOUS at 20:36

## 2023-04-13 ASSESSMENT — ACTIVITIES OF DAILY LIVING (ADL)
ADLS_ACUITY_SCORE: 39
ADLS_ACUITY_SCORE: 35
ADLS_ACUITY_SCORE: 39
ADLS_ACUITY_SCORE: 35
ADLS_ACUITY_SCORE: 39
ADLS_ACUITY_SCORE: 33
ADLS_ACUITY_SCORE: 39
ADLS_ACUITY_SCORE: 35

## 2023-04-13 NOTE — BRIEF OP NOTE
Orthopaedic Surgery Brief Op-Note     Patient: Jordana Kern  : 2000  Date of Service: 2023 3:24 PM    Preoperative Diagnosis: Patellar instability of right knee [M25.361]     Postoperative Diagnosis: same    Procedure: Procedure(s):  Right Medial patellofemoral ligament reconstuction with allograft,arthroscopy, and femoral chondroplasty  trochleoplasty, ACI Cartilage Biposy  possible lateral retinacular lengthening    Surgeon: Surgeon(s) and Role:     * Ksenia Marie MD - Primary     * Radha Sharp PA-C - Assisting     * Katheryn Nuenz MD - Resident - Assisting     * Darlin Starr MD - Resident - Assisting     * Jone Peña MD - Resident - Assisting    Anesthesia: Spinal, adductor block    Estimated Blood Loss: 50 cc    Tourniquet Time: 120 minutes at 250 mmHg     Specimen:    ID Type Source Tests Collected by Time Destination   A :  ACI Biopsy Right Knee Cartilage Tissue Knee, Right OR DOCUMENTATION ONLY Ksenia Marie MD 2023  1:48 PM        Complications: none    Condition: stable    Findings: please see dictated operative note    Plan:    PWB, <50% toe touch weight bearing to operative extremity with hinged knee brace on and locked at 10 degrees and assistive devices as needed    Hinged knee brace is to be set at 10 degrees to 90 degrees; lock at 10 degrees flexion while ambulating; the brace is to be worn at all times except with range of motion exercises and CPM use    CPM to be set at 10 degrees to flexion tolerance with goal of 90 degrees; advance aggressively in 5 degree increments as tolerated by the patient; emphasize full extension about knee    PT as outpatient; an order and PT protocol will be provided to the patient at time of discharge    ADAT    Pain control with orals prn    Bowel prophylaxis with senna-docusate    DVT prophylaxis: ASA + mechanical     Future Appointments   Date Time Provider Department Center   2023  2:40 PM [facility]   5/16/2023  3:20 PM Ksenia Marie MD [facility]       Disposition: patient may be discharged with  once appropriate discharge criteria have been met    I assisted with positioning, prepping and draping, and closure.    Radha Sharp PA-C  Orthopaedic Surgery    Please page me at [phone] with any questions/concerns. If there is no response, if it is a weekend, or if it is during evening hours, please page the orthopaedic surgery resident on call.    Implants:   Implant Name Type Inv. Item Serial No.  Lot No. LRB No. Used Action   GRAFT TENDON GRACILIS [id number] - [id number] Bone/Tissue/Biologic GRAFT TENDON GRACILIS [id number] MUSCULOSKELETAL LEIGH  Right 1 Implanted   IMP ANCHOR ARTHREX BIO-SWIVELOCK 3.5X15.8MM AR-2325BCC - [id number] Metallic Hardware/[place] IMP ANCHOR ARTHREX BIO-SWIVELOCK 3.5X15.8MM AR-2325BCC  ARTHREX [id number] Right 1 Implanted   IMP ANCHOR ARTHREX BIO-SWIVELOCK 3.5X15.8MM AR-2325BCC - [id number] Metallic Hardware/[place] IMP ANCHOR ARTHREX BIO-SWIVELOCK 3.5X15.8MM AR-2325BCC  ARTHREX [id number] Right 1 Implanted   IMP ANCHOR ARTHREX BIO-SWIVELOCK 3.5X15.8MM AR-2325BCC - [id number] Metallic Hardware/[place] IMP ANCHOR ARTHREX BIO-SWIVELOCK 3.5X15.8MM AR-2325BCC  ARTHREX [id number] Right 1 Implanted   IMP ANCHOR ARTHREX BIO-SWIVELOCK 3.5X15.8MM AR-2325BCC - [id number] Metallic Hardware/[place] IMP ANCHOR ARTHREX BIO-SWIVELOCK 3.5X15.8MM AR-2325BCC  ARTHREX [id number] Right 1 Implanted

## 2023-04-13 NOTE — ANESTHESIA POSTPROCEDURE EVALUATION
Patient: Jordana Kern    Procedure: Procedure(s):  Right Medial patellofemoral ligament reconstuction with allograft,arthroscopy, and femoral chondroplasty  trochleoplasty, ACI Cartilage Biposy  possible lateral retinacular lengthening       Anesthesia Type:  Spinal    Note:  Disposition: Admission   Postop Pain Control: Uneventful            Sign Out: Well controlled pain   PONV: No   Neuro/Psych: Uneventful            Sign Out: Acceptable/Baseline neuro status   Airway/Respiratory: Uneventful            Sign Out: Acceptable/Baseline resp. status   CV/Hemodynamics: Uneventful            Sign Out: Acceptable CV status; No obvious hypovolemia; No obvious fluid overload   Other NRE: NONE   DID A NON-ROUTINE EVENT OCCUR? No           Last vitals:  Vitals Value Taken Time   /81 04/13/23 1800   Temp 36.7  C (98  F) 04/13/23 1519   Pulse 52 04/13/23 1809   Resp 10 04/13/23 1809   SpO2 100 % 04/13/23 1806   Vitals shown include unvalidated device data.    Electronically Signed By: Andrez Doan MD  April 13, 2023  6:10 PM

## 2023-04-13 NOTE — ANESTHESIA PROCEDURE NOTES
Adductor canal Procedure Note    Pre-Procedure   Staff -        Anesthesiologist:  Kaveh Cosby MD       Resident/Fellow: Janusz rGeen MD       Performed By: resident       Location: pre-op       Procedure Start/Stop Times: 4/13/2023 10:30 AM and 4/13/2023 10:35 AM       Pre-Anesthestic Checklist: patient identified, IV checked, site marked, risks and benefits discussed, informed consent, monitors and equipment checked, pre-op evaluation, at physician/surgeon's request and post-op pain management  Timeout:       Correct Patient: Yes        Correct Procedure: Yes        Correct Site: Yes        Correct Position: Yes        Correct Laterality: Yes        Site Marked: Yes  Procedure Documentation  Procedure: Adductor canal       Laterality: right       Patient Position: supine       Patient Prep/Sterile Barriers: sterile gloves, mask       Skin prep: Chloraprep       Needle Type: short bevel       Needle Gauge: 21.        Needle Length (millimeters): 110        Ultrasound guided       1. Ultrasound was used to identify targeted nerve, plexus, vascular marker, or fascial plane and place a needle adjacent to it in real-time.       2. Ultrasound was used to visualize the spread of anesthetic in close proximity to the above referenced structure.       3. A permanent image is entered into the patient's record.    Assessment/Narrative         The placement was negative for: blood aspirated, painful injection and site bleeding       Paresthesias: No.       Bolus given via needle. no blood aspirated via catheter.        Secured via.        Insertion/Infusion Method: Single Shot       Complications: none       Injection made incrementally with aspirations every 5 mL.    Medication(s) Administered   Bupivacaine 0.25% PF (Infiltration) - Infiltration   20 mL - 4/13/2023 10:30:00 AM  Dexmedetomidine 4 mcg/mL (Perineural) - Perineural   20 mcg - 4/13/2023 10:30:00 AM  Dexamethasone 10 mg/mL PF (Perineural) -  "Perineural   2 mg - 4/13/2023 10:30:00 AM  Medication Administration Time: 4/13/2023 10:30 AM      FOR [facility] ([facility]) ONLY:   Pain Team Contact information: please page the Pain Team Via [employer]. Search \"Pain\". During daytime hours, please page the attending first. At night please page the resident first.      "

## 2023-04-13 NOTE — ANESTHESIA PROCEDURE NOTES
"Intrathecal catheter Procedure Note    Pre-Procedure   Staff -        Anesthesiologist:  Behrens, Christopher J, MD       Performed By: anesthesiologist       Location: OR       Procedure Start/Stop Times: 4/13/2023 11:30 AM and 4/13/2023 11:34 AM       Pre-Anesthestic Checklist: patient identified, IV checked, risks and benefits discussed, informed consent, monitors and equipment checked, pre-op evaluation, at physician/surgeon's request and post-op pain management  Timeout:       Correct Patient: Yes        Correct Procedure: Yes        Correct Site: Yes        Correct Position: Yes   Procedure Documentation  Procedure: intrathecal catheter       Patient Position: sitting       Skin prep: Chloraprep       Insertion Site: L3-4. (midline approach).       Needle Gauge: 22.        Needle Length (Inches): 3.5        Spinal Needle Type: Pencan       Introducer used       # of attempts: 1 and  # of redirects:     Assessment/Narrative         Paresthesias: No.       CSF fluid: clear.    Medication(s) Administered   0.75% Hyperbaric Bupivacaine (Intrathecal) - Intrathecal   1.6 mL - 4/13/2023 11:30:00 AM  Medication Administration Time: 4/13/2023 11:30 AM      FOR [facility] ([facility]) ONLY:   Pain Team Contact information: please page the Pain Team Via [place]. Search \"Pain\". During daytime hours, please page the attending first. At night please page the resident first.      "

## 2023-04-13 NOTE — DISCHARGE INSTRUCTIONS
Physical Therapy Post-Operative Guidelines  Trochleoplasty      Phase I: 0-4 Weeks   Precautions: Brace locked at 10?; NO OKC at any point; wound healing   Weight Bearing Brace ROM    WBAT with brace locked in 10? KF   Use of crutches for symptom control and reduce stress at trochlea    On & locked at 10? KF at all times except w/CPM or P/AAROM exercises   Discontinue brace for sleep at 4 weeks per comfort level (unless otherwise instructed by MD)   Open when seated  Emphasize full extension   Progress flexion to 90?KF multiple times per day   CPM 4-6 hours per day   Patellar and peripatellar joint and soft tissue mobilizations permitted   Do NOT force into painful flexion (maintain pain control of 3/10 or less with ROM)   Therapeutic Exercise and Activity    Establish high quality quad set         *Superior translation of the patella         *Avoid co-contraction with hamstrings and proximal gluteal musculature         *Utilize NMES as needed    SLR x 4         *Flexion: begin in standing à reclined standing à supine                -Progress per quad control, no extensor lag         *Abduction, Adduction, Extension   Beginner mat exercises for abdominal/lumbopelvic control and proximal hip strength   Calf raises    Standing TKE with resistance band   Isometric leg press (at intervals 40-90? KF) at comfortable level of intensity (not moving the load isotonically)   Goals:   Quad set WNL; Emphasize full knee hyperextension; SLR without lag; full hyperextension-90  ROM; resolve joint effusion     ** For Akbar Grooveplasty - functional return to activity (faster progression likely)  Phase II: 4-8 Weeks   Precautions: Weight bearing progression subject to MD discretion, concomitant procedure precautions, and patient symptom response to activity; Do not progress through increased joint swelling or crepitus - not to be tolerated with protocol progressions   Weight Bearing Brace ROM    PWB à FWB with brace on  locked in extension   No stair climbing with surgical limb    Gradually open brace per quad control   Fully open for ROM exercises   Discontinue brace with sleeping at 4 weeks unless otherwise instructed by MD    Full knee hyperextension   Progress towards full knee flexion ROM (do not force end range knee flexion)     Therapeutic Exercise and Activity (Phase II continued)    Initiate bridging with legs over exercise ball/bolster (no plank poses yet)   Increase reps w/proximal hip/abdom exercises    Initiate basic 2 limb CKC strength drills     *Shallow (0-45?) KF angles for  PFJ stress   Calf raises   Initiate 2 limb L/E proprio/balance    Marching with balance moment   Emphasize terminal knee extension control in CKC   Goals: Effusion resolving; Full extension ROM; Flexion ROM ?120?; Multi-planar L/E hip strength = MMT grade 5/5      Phase III: 8-12 Weeks   Precautions: Proper alignment for squatting activities, no running or impact activities   Weight Bearing Brace ROM    FWB, unless instructed differently by MD         *Normalize gait pattern,            avoiding knee hyperextension            in early stance  Open per quad control   Protective use when out of home: environmental hazards, crowds  Full, symmetrical ROM   Therapeutic Exercise and Activity    Progress core activities - side plank from knees, bridging w/ or w/o ball, basic 2 legged prone plank and hip strength   Initiate basic low impact cardio with bike, elliptical, walking (15-20 minutes, minimal intensity, steady pace)   Progress CKC drills - step, lunge, leg press         *Deeper KF angles (>45?) with 2 legged support         *Early KF angles (0-45?) with 1 legged support per control/tolerance   Progress L/E proprio/balance drills: single limb per control/tolerance   Goals: Effusion resolved; ROM WNL; Progressing toward normal gait pattern in FWB; Able to perform ?30 reps prior to fatigue with leg lifting; Normal LE kinematics w/2 legged CKC  activities     Phase IV: 12-16 Weeks*   Precautions: Observe/instruct proper L/E alignment w/CKC drills (avoid functional valgus); Avoid faulty mechanics that lead to PF issues   Weight Bearing Brace ROM    FWB  No brace per MD  Full ROM   Cardiovascular Proprioception/Balance Core Stability    Progress low impact cardio per symptoms - increase one variable at a time (intensity level, intervals, duration)         *15-20 min minimal            intensity, steady pace to            begin  Progress drills: Add surface challenge/perturbation on DL   Single limb activities on level surface   Directional reaching and stepping drills  Advance progression of core stabilization and bridging as tolerated   Strengthening    Increase workload with CKC drills:         *Add resistance with 2 legged squatting          *Progress depth with single limb (step, lunge, leg press)         *Initiate large muscle group weight training (HS curls, leg press, calf raises, dead lift, etc.)   Goals: Gradual progression back to full non pounding activity based on symptoms response and demonstrated control and strength       *Timeframes in later phases of rehabilitation are estimates only.  Patient may be progressed faster/slower based on their ability to attain goals for each phase.    Phase V (16 weeks+): Gradual progression back to full activity based on symptom response and demonstrated control and strength.  Recreational running and unlimited plyometrics permitted after 4 months when cleared by MD pending radiographic findings. Please reference Return to Sport or High Physical Demand Occupation Protocol for return to further advanced activities for appropriate patients    NOTE: Most patients who undertake this operation have limited sport goals and have not routinely engaged in jumping and pounding sports.  Define patient goals and expectations up front and modify protocol accordingly.      Patient to return to [facility] for physical performance testing at 24 weeks post operation      TROCHLEOPLASTY POST OPERATIVE DISCHARGE INSTRUCTIONS    FOLLOW UP APPOINTMENT  You are scheduled for a post operative wound check with Dr. Marie's clinic approximately two weeks after surgery. At approximately eight weeks after surgery, you will see Dr. Marie in clinic.     Your follow up appointments will be at the location that you regularly see Dr. Marie:    [facility]  [address] [place], [place] [address] [phone]    Physical therapy:   A referral for physical therapy will be made at discharge. You will also receive a physical therapy protocol in your after visit summary. This document must be taken to your first therapy visit.      ACTIVITY  Weight bearing status:   You are allowed to weight bear as tolerated on your operative leg using assistive devices (crutches) as needed. The hinged knee brace should be on and locked at 20 degrees.    Continuous passive motion (CPM) machine:   Perform CPM exercises for six to eight hours per day for the first four weeks after surgery. The CPM should be set at 20 degrees to flexion tolerance with goal of 90 degrees. Advance the CPM settings aggressively in increments of 5 degrees every 30 minutes until desired goal is achieved. After four weeks, the CPM machine can be returned.    Hinged knee brace:  The brace should be set at 20 degrees to 90 degrees. It is to be locked at 20 degrees at all times except with CPM or ROM exercises. The brace is to be worn for three to six weeks following surgery. Sleep with the brace on until directed.    Exercises:   Perform the following exercises at least three times per day for the first four weeks after surgery to prevent complications, such as blood clots in your legs:  1) Point and flex your feet  2) Move your ankle around in big circles  3) Wiggle your toes   Also, perform thigh  muscle tightening exercises for 10 to 15 minutes at least three times per day for the first four weeks after surgery.    Athletic Activities:  Activities such as swimming, bicycling, jogging, running, and stop-and-go sports should be avoided until permitted by your provider.    Driving:  Driving is not permitted until directed by your provider. Typically, driving is restricted for three to four weeks after right knee surgery and three weeks after left knee surgery. Under no circumstance are you permitted to drive while using narcotic pain medications.    Return to Work:  You may return to work when directed by your provider. Typically, patients with desk/sitting jobs can return to work within two weeks while patients with heavy labor jobs can return to work around three months after surgery.      COMFORT AND PAIN MANAGEMENT  Elevation:   During times of inactivity throughout the first two weeks after surgery, make an effort to decrease swelling by elevating your operative extremity. This is most effectively done by lying down and placing several pillows lengthwise under your thigh and calf to raise your toes above the level of your nose. To ensure that your knee remains in full extension, do not place pillows directly under your knee.     Icing:  An ice pack will be provided to control swelling and discomfort after surgery. Place a thin towel on your skin and apply the ice pack overtop. You may apply ice for 20 minutes as often as two times per hour.    Pain Medications:  You will be discharged with acetaminophen (Tylenol) and a narcotic medication for pain management after surgery. Acetaminophen is most effective when it is taken per the schedule outlined by your provider (every four, six, or eight hours as prescribed). You may safely use acetaminophen as prescribed for the first four weeks after surgery provided you do not exceed the maximum daily dose prescribed by your provider (usually 3000 mg - 4000 mg). The  narcotic pain medication should only be taken on an as-needed basis when necessary and should be reserved for severe pain that is not controlled with scheduled acetaminophen. In the first three days following surgery, your symptoms may warrant use of the narcotic pain medication every three, four, or six hours as prescribed. After three days, focus your efforts on decreasing (tapering) use of narcotic medications.   The most successful tapering strategy is to first, decrease the dose (number of tablets) and second, increase the interval (time in between doses). For example, if you begin taking two tablets every four hours after surgery, start your taper by decreasing one of these doses to one tablet. Every one to two days, decrease another dose to one tablet until you are eventually taking one tablet every four hours. Once this is achieved, focus on increasing the number of hours between doses, moving from one tablet every four hours to one tablet every six hours. As tolerated, continue to increase the interval to eight and twelve hours. Eventually, taper to one dose every evening and discontinue when no longer needed.      ANTICOAGULATION  Depending on your risk factors, your provider may prescribe aspirin to prevent blood clots. If prescribed, take aspirin daily for the first four weeks after surgery.      WOUND CARE AND SHOWERING  Wound care:  Remove compression dressing (tubigrip sleeve) twice daily for 10 minutes (to prevent skin breakdown).   Remove the compressive device for showering (see showering instructions below).   Discontinue Compression dressing two days after surgery  A sterile dressing was placed over your surgical incision. This dressing should be kept in place for the first seven days after surgery.   After seven days, remove the dressing and leave the incision open to air, covering only for showering (see showering instructions below).   Your surgical incision was closed with Exofin (a surgical  adhesive that is directly on the incision areas). The Exofin should be left on until it falls off or is removed at your first office visit.   DO NOT pick or scratch your incision.   Please contact Dr. Marie's office if you notice the following:   Significant cloudy, bloody, or malodorous drainage from the incision  Excessive warmth and redness around the incision.    Showering:  You may shower beginning two days after surgery, however, the surgical incision must remain dry until your first office visit.   Always remove the ACE wrap or tubigrip compression sleeve for showering.   During the first seven days after surgery, your surgical dressing will prevent the incision from becoming wet.   Once the surgical dressing is removed, cover the incision with saran wrap (or any other non-permeable covering) to keep the incision dry while showering.  You are strictly prohibited from soaking or submerging the surgical wound underwater.     Tub Bathing:  Tub bathing, swimming, or any other activities that cause your incision to be submerged should be avoided until allowed by your provider. Typically, patients are allowed to return to these activities six weeks after surgery.      CONTACTING YOUR PHYSICIAN:  You may experience symptoms that require follow-up before your scheduled two week appointment. Please contact Dr. Marie's office if you experience:  1) Pain in your knee that persists or worsens in the first few days after surgery  2) Excessive redness or drainage of cloudy or bloody material from the wounds (clear red tinted fluid and some mild drainage should be expected) or drainage of any kind five days after surgery  3) A temperature elevation greater than 101.5 F   4) Pain, swelling or redness in your calf  5) Numbness or weakness in your leg or foot      Regular business hours (Monday - Friday, 8am - 5pm):  [facility]: [phone]    After hours and  weekends:  [facility] on call Orthopedic resident: [phone]

## 2023-04-13 NOTE — OP NOTE
PREOPERATIVE DIAGNOSES:   1. Right knee acute on chronic patellar instability.   2. 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  3. Significant J sign   4.  No patella rosa.   5. Moderate tilt with lateral tightness.   6. Assess cartilage integrity.    POSTOPERATIVE DIAGNOSES:   1. Right  knee acute on chronic patellar instability.   2. Patella w/ wide area of grade 2-3  cartilage wear with a central lesion of bare bone  3. Trochlear groove w/  Small area of grade 2-3 at the most superior-lateral trochlea.  4.  Wide area of bare grade 2-3 cartilage wear encompassing the mid-inferior patella, both facets. ( 24 mm x 12 mm; ht. Xwidth)\  5.  Small area of grade 4 bare bone changes (16 x 12mm)  6.  No significant cartilage or meniscus pathology, tibiofemoral joint.     OPERATIONS:   1. Right knee exam under anesthesia.   2. Diagnostic arthroscopy with patella chondroplasty  3. Trochleoplasty.   4. Lateral retinacular lengthening  6. Medial patellofemoral ligament reconstruction using gracilis allograft.  7. ROBEL cartilage biopsy    OPERATORS: Ksenia Marie  ASSISTANTS: Radha Sharp PA-C  ANESTHESIA:  Spinal supplemented w/ an adductor block medially and a austyn-articular injection of Robivicaine laterally  FINDINGS: Exam under anesthesia revealed range of motion 5/-/130 degrees   While awake, the patient  had  (+) J tracking; under anesthesia hard J tracking to passive range of motion persisted  I could dislocate the patella when asleeep.  Patella showed significant central cartilage wear    APIF  Lateral trochlear inclination angle 4 degrees  Lateral patella tilt 26 degrees  Sulcus angle 167 degrees  C/D1.1       I/S1.5  P TI 0.66  Boss:   6.3 mm    Arthroscopic findings revealed no fluid upon entry into the joint.   The patient's patellofemoral compartment:    Hard J-sign both awake and asleep  Lateral patellar maltracking as viewed arthroscopically.  The patient's medial and  lateral compartment showed pristine cartilage surfaces when visualized and probed and normal menisci.     DESCRIPTION OF PROCEDURE: Under spinal, the patient was positioned supine on the operating room table. The patient's leg was prepped and draped in the usual sterile fashion. A pause was performed identifying the correct leg, the administration of antibiotic,s and appropriate coverage with a lead apron for anticipated use of the fluoroscopy unit.   A diagnostic arthroscopy was performed using an anterolateral portals for visualization, respectively. Diagnostic arthroscopy findings as stated above.   We then made a medial portal, and with a shaver, performed a chondroplasty to the patella, as well as open debridement when open.  At the end of the arthroscopy, excess fluid was removed from the knee. We then elevated the tourniquet to  250 mmHg after Esmarch exsanguination of the leg.   We made a midline incision to enter the skin, a vastus splitting incision to enter the joint.  We confirmed the need for a trochleoplasty by the shape of the trochlear.    We began with lengthening the lateral soft tissue structures.  We divided the vastus lateralis from the ITB, lifting layer one from its insertion on the patella.  We then cut the second layer deep as it inserted into the lateral IM septum, and saw a generous release of the tissue.   We retracted the patella medially and felt we had good visualization of the trochlea from the lateral approach.  We then turned attention back to the trochlea.  We marked the borders of the trochlear cuts on the medial and lateral side of the trochlea.  We began the trochleoplasty proper by making two cortical cuts circumferentially around marked area of the trochlea, creating a window to serve as entry into the undersurface of the trochlea cartilage surface. We whit out our anticipated trochea plasty, defining our native trochlea and what we anticipated would be our new trochlea  groove. This was done with a marking pen on the cartilage surfaces.   Using a combination of  small sharp osteotomes and the Chemclinrex trochleaoplasty griselda of 5mm thickness, we then undercut the trochlear bone. We undermined the cartilage flap to just superior to the inter condyler notch.  Cartilage surfaces were kept moist during this time, generously irrigating this with cool water.  We then checked the flexibility of our osteotomy flap.   We felt that the osteotomy flap was flexible enough that we did not need to do a central osteotomy cut.   Once we had satisfactory morphology to our trochlear cut and we had good apposition between the cartilage surfaces and the distal bone cut, we prepared for our fixation.   Using a 3.5 swivel lock device, we placed a swivel lock device in the deep knee just above the intercondylar notch.  This swivel lock device was threaded with  three #1 PDS sutures .  Once we had satisfactory fixation, we placed 3 swivel lock devices superiorly to the superior extent of the trochlear cartilage standing central, medial, and lateral.  We then bone grafted this with cancellous bone taken previously from the underneath side of the trochlea, placed any remaining bone graft underneath the osteotomy to secure adequate bone coverage underneath.   We then used the PDS suture placed in the superior swivel lock device is to bring the synovium down to the superior cartilage border.   We then prepared to do our MPFL. We brought the patella back to the new groove. There remained lateral tightness. We then performed a lengthening. We then did our lateral retinacular lengthening by removing the iliotibial band from its attachment on to the patella. We were able to find a 2-layer construct and were able to lengthen it approximately 20 mm. This was done extra-articular. We cut the first layer of the lateral retinacular structures close to the bone and the second layer as it inserted into the deep structures  close to the lateral intermuscular septum. We then bent the knee to 60 degrees and sewed the near end of layer 1 to the far end of layer 2.     We inspected the  Patellar again when open; minor cartilage debridement was performed.  We made an intra-operative decision to obtain a ROBEL biopsy for (potential) later use, taking 3 currette strips of cartilage from the intracondylar notch.  We then brought the C-arm in the room. We found the approximate location of the MPFL on the patella and placed a K-wire medial to lateral across the patella. This position was confirmed with C-arm. We then created a 10 mm length, 4 mm wide tunnel or docking station on the medial patella. We then placed a leader suture medial to lateral across the patella.   Through the same  Incision,, we identified the adductor inga insertion onto the adductor tubercle. We then placed a leader suture around this tendon.   During this time a hamstring allograft was brought into the room. We tubularized this to fit a 4 mm tunnel. We then put leader sutures on both ends of the graft.   We then brought the graft up onto the table and using the leader sutures as a guide, we threaded the graft into the medial border of the patella. We secured the graft in 2 ways. The sutures exiting the lateral border of the patella were tied into the soft tissues. As the graft exited the medial border of the patella, we sewed the soft tissue into the graft with a box stitch of #1 Vicryl.   We then brought the graft underneath the medial retinaculum counterclockwise around the adductor inga tendon, back underneath the medial retinaculum to exit at the mid medial portion of the patella.   We placed the knee at 40 degrees of flexion. With the 2 arms of the graft crossed over one another just distal to the adductor inga tendon, we placed a suture of #1 Ethibond in place. This was after we had secured the patella in the groove at 40 degrees of flexion and pulled the graft  snug but not over-taut.   We then brought the knee through a full range of motion and felt we had a good construct with full motion on the table and 2-quadrant passive lateral mobility with a firm endpoint.   We then placed the knee at 20 degrees of flexion and secured the second or distal arm of the graft into the mid medial border of the patella with soft tissue technique. We then placed the knee through a full range of motion. The tourniquet was let down at this point in time.   Turning attention back to the lateral side, we closed the lateral retinaculum from the previous lengthening procedure securing the far end of layer 1 to the near end of layer 2 with #1 Vicryl sutures.  We we try to secure as much capsular closure as we could by using surrounding fat to close any remaining gap  The adductor fascia was closed with figure-of-eight sutures of #1 Vicryl. The medial arthrotomy was then closed with a running #1 Vicryl stitch in the subvastus and superficial vastus fascia. The extensor mechanism arthrotomy itself was closed with figure-of-eight sutures of #1 Vicryl. The adductor fascia was closed with a running #1 Vicryl. Subcutaneous tissue of both incisions was closed with 2-0 Vicryl. The skin was closed with running Monocryl. Steri-Strips, Betadine, Adaptic, a sterile dressing and a Tubigrip stockinette was put in place.  Tourniquet was let down after the MPFL was passed.   Total tourniquet time was 120 min.   The patient's knee was then placed in a locked hinge brace locked at 10 degrees of flexion. The patient will be maintained partial weightbearing on crutches.  Advised to use postop.  The trochlear plasty protocol will be followed.  CPM will be used through a comfortable range of motion starting at 10 degrees and ending at 90 degrees.   The patient was taken to the recovery in satisfactory condition.    Radha Sharp PA-C was a necessary part of the case to help make the MPFL graft, to help with tissue  retraction and limb stabilization during the case.  We had 2 other residents spend partial time with us due to other duties/  There was a resident learner available for the case, but a second set of hands was necessary  to  help manage the limb in the OR, help with tissue retraction to ensure maximum exposure and maximum surgical efficiency, both which promotes best practice and best surgical outcomes.  A PA was an essential part of this case.    Ksenia Marie MD  Professor Orthopedic Surgery  [facility]

## 2023-04-13 NOTE — ANESTHESIA CARE TRANSFER NOTE
Patient: Jordana Kern    Procedure: Procedure(s):  Right Medial patellofemoral ligament reconstuction with allograft,arthroscopy, and femoral chondroplasty  trochleoplasty, ACI Cartilage Biposy  possible lateral retinacular lengthening       Diagnosis: Patellar instability of right knee [M25.361]  Diagnosis Additional Information: No value filed.    Anesthesia Type:   Spinal     Note:    Oropharynx: oropharynx clear of all foreign objects and spontaneously breathing  Level of Consciousness: awake  Oxygen Supplementation: room air    Independent Airway: airway patency satisfactory and stable  Dentition: dentition unchanged  Vital Signs Stable: post-procedure vital signs reviewed and stable  Report to RN Given: handoff report given  Patient transferred to: PACU    Handoff Report: Identifed the Patient, Identified the Reponsible Provider, Reviewed the pertinent medical history, Discussed the surgical course, Reviewed Intra-OP anesthesia mangement and issues during anesthesia, Set expectations for post-procedure period and Allowed opportunity for questions and acknowledgement of understanding      Vitals:  Vitals Value Taken Time   /65 04/13/23 1519   Temp     Pulse 61 04/13/23 1525   Resp 16 04/13/23 1525   SpO2 99 % 04/13/23 1525   Vitals shown include unvalidated device data.    Electronically Signed By: RADHA Johnson CRNA  April 13, 2023  3:27 PM

## 2023-04-14 ENCOUNTER — APPOINTMENT (OUTPATIENT)
Dept: PHYSICAL THERAPY | Facility: CLINIC | Age: 23
End: 2023-04-14
Attending: PHYSICIAN ASSISTANT
Payer: COMMERCIAL

## 2023-04-14 ENCOUNTER — TELEPHONE (OUTPATIENT)
Dept: ORTHOPEDICS | Facility: CLINIC | Age: 23
End: 2023-04-14
Payer: COMMERCIAL

## 2023-04-14 VITALS
BODY MASS INDEX: 23.5 KG/M2 | WEIGHT: 119.71 LBS | HEART RATE: 55 BPM | TEMPERATURE: 96.8 F | SYSTOLIC BLOOD PRESSURE: 104 MMHG | DIASTOLIC BLOOD PRESSURE: 62 MMHG | HEIGHT: 60 IN | OXYGEN SATURATION: 96 % | RESPIRATION RATE: 16 BRPM

## 2023-04-14 LAB — HGB BLD-MCNC: 13.1 G/DL (ref 11.7–15.7)

## 2023-04-14 PROCEDURE — 250N000011 HC RX IP 250 OP 636: Performed by: PHYSICIAN ASSISTANT

## 2023-04-14 PROCEDURE — 97530 THERAPEUTIC ACTIVITIES: CPT | Mod: GP

## 2023-04-14 PROCEDURE — 97161 PT EVAL LOW COMPLEX 20 MIN: CPT | Mod: GP

## 2023-04-14 PROCEDURE — 85018 HEMOGLOBIN: CPT | Performed by: PHYSICIAN ASSISTANT

## 2023-04-14 PROCEDURE — 97116 GAIT TRAINING THERAPY: CPT | Mod: GP

## 2023-04-14 PROCEDURE — 250N000013 HC RX MED GY IP 250 OP 250 PS 637: Performed by: PHYSICIAN ASSISTANT

## 2023-04-14 PROCEDURE — 999N000111 HC STATISTIC OT IP EVAL DEFER

## 2023-04-14 PROCEDURE — 36416 COLLJ CAPILLARY BLOOD SPEC: CPT | Performed by: PHYSICIAN ASSISTANT

## 2023-04-14 RX ORDER — FLUTICASONE FUROATE AND VILANTEROL 200; 25 UG/1; UG/1
1 POWDER RESPIRATORY (INHALATION) DAILY
COMMUNITY
Start: 2023-04-15

## 2023-04-14 RX ORDER — POLYETHYLENE GLYCOL 3350 17 G/17G
17 POWDER, FOR SOLUTION ORAL DAILY
Qty: 10 PACKET | Refills: 0 | Status: SHIPPED | OUTPATIENT
Start: 2023-04-14

## 2023-04-14 RX ORDER — METHOCARBAMOL 750 MG/1
750 TABLET, FILM COATED ORAL EVERY 6 HOURS PRN
Qty: 40 TABLET | Refills: 0 | Status: SHIPPED | OUTPATIENT
Start: 2023-04-14

## 2023-04-14 RX ADMIN — ASPIRIN 81 MG: 81 TABLET, COATED ORAL at 09:20

## 2023-04-14 RX ADMIN — OXYCODONE HYDROCHLORIDE 10 MG: 10 TABLET ORAL at 09:20

## 2023-04-14 RX ADMIN — ACETAMINOPHEN 975 MG: 325 TABLET ORAL at 02:06

## 2023-04-14 RX ADMIN — CEFAZOLIN SODIUM 1 G: 1 INJECTION, POWDER, FOR SOLUTION INTRAMUSCULAR; INTRAVENOUS at 03:46

## 2023-04-14 RX ADMIN — ACETAMINOPHEN 975 MG: 325 TABLET ORAL at 10:44

## 2023-04-14 ASSESSMENT — ACTIVITIES OF DAILY LIVING (ADL)
ADLS_ACUITY_SCORE: 39
ADLS_ACUITY_SCORE: 36
ADLS_ACUITY_SCORE: 19
ADLS_ACUITY_SCORE: 39

## 2023-04-14 NOTE — TELEPHONE ENCOUNTER
Patient's mother was called back and her questions were answers.  They can be found in the AVS from surgery.  She will call back if they have any other questions.

## 2023-04-14 NOTE — PROGRESS NOTES
Orthopaedic Surgery Progress Note   April 14, 2023    Subjective: No acute events overnight. Pain well controlled. Tolerating diet. Voiding spontaneously, 1x straight cath ON. +Flatus, no BM. Denies fever or chills, CP, SOB, numbness or tingling, motor dysfunction or weakness.     Objective: /62   Pulse 55   Temp 96.8  F (36  C)   Resp 16   Ht 1.524 m (5')   Wt 54.3 kg (119 lb 11.4 oz)   LMP 03/30/2023 (Approximate)   SpO2 96%   BMI 23.38 kg/m      General: NAD, alert and oriented, cooperative with exam.   Cardio: RRR, extremities wwp.   Respiratory: Non-labored breathing.  MSK: RLE: Toes wwp, DP 2+, bcr in all toes.  +EHL/FHL/GSC/TA. SILT SP/DP/Sa/Del Angel/T. Dressing c/d/i.     Hemoglobin   Date Value Ref Range Status   04/14/2023 13.1 11.7 - 15.7 g/dL Final   ]  All cultures:  No results for input(s): CULT in the last 168 hours.    Assessment and Plan: Jordana Kern is a 23 year old female with now s/p R knee arthroscopy, trochleoplasty, and MPFL reconstruction on 4/14 with Dr. Marie. Doing well post-operatively.     Ortho Primary    PWB, <50% toe touch weight bearing to operative extremity with hinged knee brace on and locked at 10 degrees and assistive devices as needed    Hinged knee brace is to be set at 10 degrees to 90 degrees; lock at 10 degrees flexion while ambulating; the brace is to be worn at all times except with range of motion exercises and CPM use    CPM to be set at 10 degrees to flexion tolerance with goal of 90 degrees; advance aggressively in 5 degree increments as tolerated by the patient; emphasize full extension about knee    PT as outpatient; an order and PT protocol will be provided to the patient at time of discharge    ADAT    Pain control with orals prn    Bowel prophylaxis with senna-docusate    DVT prophylaxis: ASA + mechanical    Darlin Starr MD  Orthopaedic Surgery Resident, PGY-4

## 2023-04-14 NOTE — PLAN OF CARE
VS: /61 (BP Location: Right arm, Patient Position: Supine)   Pulse 53   Temp (!) 96.3  F (35.7  C) (Oral)   Resp 16   Ht 1.524 m (5')   Wt 54.3 kg (119 lb 11.4 oz)   LMP 03/30/2023 (Approximate)   SpO2 95%   BMI 23.38 kg/m       O2: RA   Output: Continent of bowel and bladder   Last BM:    Activity: A1 Pivot   Up for meals? Yes    Skin: Incision: R knee   Pain: Pain tolerable, did not request pain medications   CMS: Alert and orientedx4, reports numbness in BLE   Dressing: UTV, ACE wrapped   Diet: Regular    LDA: PIV L hand   Equipment: IV pole, capno   Plan: Possible discharge today   Additional Info: Straight cath'ed once during night shift. Able to void once after and bladder scan 150. Continue to monitor bladder.

## 2023-04-14 NOTE — PROGRESS NOTES
[facility]  OUTPATIENT PHYSICAL THERAPY EVALUATION  PLAN OF TREATMENT FOR OUTPATIENT REHABILITATION  (COMPLETE FOR INITIAL CLAIMS ONLY)  Patient's Last Name, First Name, M.I.  YOB: 2000  Jrodana Kern                           Provider's Name  [facility] Medical Record No.  [id number]                             Onset Date:  04/13/23   Start of Care Date:   4/14/23   Type:     _X_PT   ___OT   ___SLP Medical Diagnosis:                 PT Diagnosis:  Impaired functional mobility Visits from SOC:  1     See note for plan of treatment, functional goals and certification details    I CERTIFY THE NEED FOR THESE SERVICES FURNISHED UNDER        THIS PLAN OF TREATMENT AND WHILE UNDER MY CARE     (Physician co-signature of this document indicates review and certification of the therapy plan).              04/14/23 0800   Appointment Info   Signing Clinician's Name / Credentials (PT) Salud Obrien DPT   Rehab Comments (PT) <50% PWB TTWB R LE   Living Environment   People in Home parent(s)   Current Living Arrangements house  (Has apartment, staying at parent's house for recovery)   Home Accessibility stairs to enter home  (Moved stuff to the main level, no stairs inside needed)   Number of Stairs, Main Entrance   (4 stairs garage, 2 big stairs front porch)   Stair Railings, Main Entrance none   Transportation Anticipated family or friend will provide   Living Environment Comments 4 stairs no rails to enter garage, staying at parent's for recovery   Self-Care   Usual Activity Tolerance excellent   Current Activity Tolerance good   Equipment Currently Used at Home none  (Has crutches, walk in shower with built in ledge)   Fall history within last six months no   Activity/Exercise/Self-Care Comment Knee dislocate and lose balance at times; active as a nurse occupationally prior   General Information   Onset of Illness/Injury or Date of Surgery  04/13/23   Referring Physician Radha Sharp PA-C   Patient/Family Therapy Goals Statement (PT) To return home to her parent's   Pertinent History of Current Problem (include personal factors and/or comorbidities that impact the POC) Right Medial patellofemoral ligament reconstuction with allograft,arthroscopy, and femoral chondroplasty  trochleoplasty, ACI Cartilage Biposy  possible lateral retinacular lengthening   Existing Precautions/Restrictions brace worn when out of bed;fall;weight bearing  (ROM in HKB - 10-90 when unlocked, locked at 10* for amb)   Weight-Bearing Status - LUE full weight-bearing   Weight-Bearing Status - RUE full weight-bearing   Weight-Bearing Status - LLE full weight-bearing   Weight-Bearing Status - RLE partial weight-bearing (% in comments)  (<50% PWB TTWB)   General Observations Pt very pleasant and agreeable, motivated to work with PT   Cognition   Cognitive Status Comments Follows cues, asks appropriate questions, verbalizes needs well   Pain Assessment   Patient Currently in Pain Yes, see Vital Sign flowsheet   Integumentary/Edema   Integumentary/Edema   (Does have R LE in wrap with brace on but does have visible edema to R compared to L)   Posture    Posture Not impaired   Range of Motion (ROM)   Range of Motion   (R LE in HKB with limitations of 10-90* in bed and locked at 10* with weight bearing)   Strength (Manual Muscle Testing)   Strength (Manual Muscle Testing)   (L LE WFL for mobility, offloading R LE due to pain and restrictions post-op)   Bed Mobility   Comment, (Bed Mobility) Ind bed mob   Transfers   Comment, (Transfers) SBA transfers with use of FWW   Gait/Stairs (Locomotion)   Comment, (Gait/Stairs) Progress to SBA with FWW   Balance   Balance no deficits were identified   Sensory Examination   Sensory Perception patient reports no sensory changes   Clinical Impression   Criteria for Skilled Therapeutic Intervention Evaluation only  (1x evaluation and treatment)    PT Diagnosis (PT) Impaired functional mobility   Influenced by the following impairments Pain, post-op restrictions/ROM restrictions/WB restrictions   Functional limitations due to impairments Impaired bed mob, transfers, amb, and stairs   Clinical Presentation (PT Evaluation Complexity) Stable/Uncomplicated   Clinical Presentation Rationale Per clinical judgment   Clinical Decision Making (Complexity) low complexity   Planned Therapy Interventions (PT) gait training;stair training;transfer training   Anticipated Equipment Needs at Discharge (PT)   (None, has crutches and FWW)   Risk & Benefits of therapy have been explained evaluation/treatment results reviewed;care plan/treatment goals reviewed;risks/benefits reviewed;current/potential barriers reviewed;participants voiced agreement with care plan;participants included;patient;mother;father   Clinical Impression Comments SBA w/ FWW for all mobility   PT Total Evaluation Time   PT Eval, Low Complexity Minutes (34209) 8   Plan of Care Review   Plan of Care Reviewed With patient;father;mother   Physical Therapy Goals   PT Frequency One time eval and treatment only   PT Predicted Duration/Target Date for Goal Attainment 04/14/23   PT Goals Bed Mobility;Transfers;Gait;Stairs   PT: Bed Mobility Independent;Supine to/from sit;Rolling;Within precautions;Goal Met   PT: Transfers Supervision/stand-by assist;Sit to/from stand;Bed to/from chair;Assistive device;Within precautions;Goal Met   PT: Gait Supervision/stand-by assist;Assistive device;Rolling walker;Within precautions;Greater than 200 feet;Goal Met   PT: Stairs Supervision/stand-by assist;Within precautions;4 stairs;Goal Met   Interventions   Interventions Quick Adds Gait Training;Therapeutic Activity   Therapeutic Activity   Therapeutic Activities: dynamic activities to improve functional performance Minutes (01127) 15   Symptoms Noted During/After Treatment None   Treatment Detail/Skilled Intervention Pt supine  in bed on PT arrival. Discusses that she has her own crutches here but she feels uncoordinated with these and does not feel it was going well. PT brought in FWW and educated on PWB with FWW and pt agrees that she would prefer this. Her mother has one at home she can use. She had questions about WB and managing brace as well as when it could be locked/unlocked so we went over education and orientation to her HKB, she was able to demonstrate successfully locking and unlocking. Family had questions about CPM so we educated on this as well. Educated on the role of outpatient PT and what they will work on for pt as well. Pt was able to demonstrate ind dressing of lower body including undergarment and shorts for her comfort. Educated pt's parents on guarding techniques to assist pt with stairs at home and they deny any concerns with this. Pt and parents denied further questions following education.   Gait Training   Gait Training Minutes (89910) 29   Symptoms Noted During/After Treatment (Gait Training) fatigue;increased pain   Treatment Detail/Skilled Intervention Pt completes sup>sit EOB ind. STS at FWW with SBA and essentially chooses TTWB initially, educated on note of <50% PWB and that she can ease into more as tolerable but to limit to <50% and she verbalized understanding. Pt amb w/ FWW and SBA for 300ft total. Did take brief standing rest breaks to take a break for her hands and shoulders. Amb to PT gym and educated on stair strategies, pt elects to go up and down on her bottom. We tried both strategies of sitting in chair perpedicular to stairs and then pivoting to sit lower onto stair and she did struggle with this, but was eventually successful. We then tried strategy of backing up with FWW to step and then reaching back to sit down on stair and she was more comfortable in this fashion. With placing her arms up on next step she was able to push and clear her bottom to scoot up one step at a time and then with  arms on same step able to clear her bottom to scoot down a step. Able to complete STS at FWW with CGA maintaining precautions well. She was able to finish amb bout w/ FWW back to room and was ind getting back into bed. Pt denies further concerns or PT needs.   PT Discharge Planning   PT Plan DC PT   PT Discharge Recommendation (DC Rec) home;home with assist;home with outpatient physical therapy   PT Rationale for DC Rec Pt has good family support, amb greater than household distances with FWW, completed stair training and educated on several strategies available, pt and family deny further concerns   PT Brief overview of current status SBA w/ transfer and amb w/ FWW, ind bed mob   Total Session Time   Timed Code Treatment Minutes 44   Total Session Time (sum of timed and untimed services) 52

## 2023-04-14 NOTE — PLAN OF CARE
Pt. discharged at 1100 to home, and left with personal belongings. Pt. received complete discharge paperwork and all medications as filled by discharge pharmacy. Pt received and signed for the narcotic medication Oxycodone. Pt. was given times of last dose for all discharge medications in writing on discharge medication sheets. Discharge teaching included narcotic medication, pain management, activity restrictions, dressing changes, and signs and symptoms of infection. Dressing supplies sent home. Pt. to follow up with primary care provider in 10 days,(April 28th). Pt. had no further questions at the time of discharge and no unmet needs were identified. Pt's parents were here for discharge teaching and drove patient home.

## 2023-04-14 NOTE — PROGRESS NOTES
Orthopaedic Surgery Progress Note 04/14/2023    Subjective  POD 1 from the below procedure. No acute events overnight.  Pain well controlled. Denies new numbness, tingling, or weakness.  Tolerating diet without nausea or vomiting.  Voiding spontaneously.    Denies fevers, chills, chest pain, SOB.      Procedure   Right Medial patellofemoral ligament reconstuction with allograft,arthroscopy, and femoral chondroplasty  trochleoplasty, ACI Cartilage Biposy  possible lateral retinacular lengthening  Objective  Temp: (!) 96.3  F (35.7  C) Temp src: Oral BP: 105/61 Pulse: 53   Resp: 16 SpO2: 95 % O2 Device: None (Room air) Oxygen Delivery: 4 LPM    Exam:  Gen: No acute distress, resting comfortably in bed.  Resp: Non-labored breathing  Cardio: Regular rate via peripheral pulse  MSK:      RLE:  - Dressings c/d/I  - Fires Quad, TA, GSC, EHL, FHL  - SILT femoral/tibial/sural/saphenous/DP/SP nerves  - PT/DP pulses 2+, foot wwp        No lab results found in last 7 days.    Invalid input(s): SEDRATE      Assessment: Jordana Kern is a 23 year old female s/p the procedure listed below on 4/13 with Dr. Marie. Doing well.  Right Medial patellofemoral ligament reconstuction with allograft,arthroscopy, and femoral chondroplasty  trochleoplasty, ACI Cartilage Biposy  possible lateral retinacular lengthening  Plan:  Ortho Primary    PWB, <50% toe touch weight bearing to operative extremity with hinged knee brace on and locked at 10 degrees and assistive devices as needed    Hinged knee brace is to be set at 10 degrees to 90 degrees; lock at 10 degrees flexion while ambulating; the brace is to be worn at all times except with range of motion exercises and CPM use    CPM to be set at 10 degrees to flexion tolerance with goal of 90 degrees; advance aggressively in 5 degree increments as tolerated by the patient; emphasize full extension about knee    PT as outpatient; an order and PT protocol will be provided to the patient at time of  discharge    ADAT    Pain control with orals prn    Bowel prophylaxis with senna-docusate    DVT prophylaxis: ASA + mechanical  Consults: PT, OT, appreciate assistance in caring for this patient.  Follow-up: Clinic with Dr. Marie within 2 weeks    Disposition: Pending progress with therapies, pain control on orals, and medical stability, anticipate discharge to home on POD # 1-2.        Thank you,    Jone Peña MD   PGY1  Department of Orthopaedic Surgery      Please page me at [phone] with any questions/concerns. If there is no response, if it is a weekend, or if it is during evening hours then please page the orthopaedic surgery resident on call.     Future Appointments   Date Time Provider Department Center   4/28/2023  2:40 PM Radha Sharp PA-C [facility]   5/16/2023  3:20 PM Ksenia Marie MD [facility]

## 2023-04-14 NOTE — PROGRESS NOTES
PACU to Inpatient Nursing Handoff    Patient Jordana Kern is a 23 year old female who speaks English.   Procedure Procedure(s):  Right Medial patellofemoral ligament reconstuction with allograft,arthroscopy, and femoral chondroplasty  trochleoplasty, ACI Cartilage Biposy  possible lateral retinacular lengthening   Surgeon(s) Primary: Ksenia Marie MD  Assisting: Radha Sharp PA-C  Resident - Assisting: Darlin Starr MD; Katheryn Nunez MD; Only, MD Jone     Allergies   Allergen Reactions     Benadryl [Diphenhydramine] Other (See Comments)     Irritated, wound up       Isolation  No active isolations     Past Medical History   has a past medical history of Motion sickness.    Anesthesia Choice with Block   Dermatome Level Dermatomes Left: L3  Dermatomes Right: L3   Preop Meds acetaminophen (Tylenol) - time given: 0934  TXA - time given: 0935   Nerve block Adductor canal.  Location:right. Med:Total Knee/Hip Cocktail (ropivacaine, epinephrine, ketorolac, morphine). Time given: 1030   Intraop Meds lidocaine 2% (mg)  Total dose:  50 mg    Date/Time Rate/Dose/Volume Action Route Admin User Audit    04/13/23 1124 50 mg Given Intravenous Elenita, Iliana, APRN CRNA       propofol 10 mg/mL (mg)  Total dose:  130 mg    Date/Time Rate/Dose/Volume Action Route Admin User Audit    04/13/23 1125 40 mg Given Intravenous Elenita, Iliana, APRN CRNA     1127 20 mg Given Intravenous Elenita, Iliana, APRN CRNA     1220 40 mg Given Intravenous Lucille Cox APRN CRNA     1500 30 mg Given Intravenous Elentia, Iliana, APRN CRNA       propofol drip mcg/kg/min (mcg/kg/min)  Total dose:  1,281.48 mg Dosing weight:  54.3    Date/Time Rate/Dose/Volume Action Route Admin User Audit    04/13/23 1126 150 mcg/kg/min - 48.87 mL/hr New Bag Intravenous Elenita, Iliana, APRN CRNA     1129 125 mcg/kg/min - 40.725 mL/hr Rate Change Intravenous Elenita, Iliana, APRN CRNA     1132 100 mcg/kg/min - 32.58 mL/hr Rate Change  Intravenous Elenita, Iliana, APRN CRNA     1314 125 mcg/kg/min - 40.725 mL/hr Rate Change Intravenous Elenita, Iliana, APRN CRNA     1445 75 mcg/kg/min - 24.435 mL/hr Rate Change Intravenous Elenita, Iliana, APRN CRNA edited    1501  Stopped Intravenous Elenita, Iliana, APRN CRNA       dexamethasone (DECADRON) 4 mg/mL (mg)  Total dose:  4 mg    Date/Time Rate/Dose/Volume Action Route Admin User Audit    04/13/23 1132 4 mg Given Intravenous Elenita, Iliana, APRN CRNA       ondansetron 2 mg/mL (mg)  Total dose:  8 mg    Date/Time Rate/Dose/Volume Action Route Admin User Audit    04/13/23 1113 4 mg Given Intravenous Elenita, Iliana, APRN CRNA     1458 4 mg Given Intravenous Elenita, Iliana, APRN CRNA       Bupivacaine 0.25% PF (Infiltration) (mL)  Total volume:  20 mL    Date/Time Rate/Dose/Volume Action Route Admin User Audit    04/13/23 1030 20 mL Given Infiltration Janusz Green MD       Dexmedetomidine 4 mcg/mL (Perineural) (mcg)  Total dose:  20 mcg    Date/Time Rate/Dose/Volume Action Route Admin User Audit    04/13/23 1030 20 mcg Given Perineural Janusz Green MD       Dexamethasone 10 mg/mL PF (Perineural) (mg)  Total dose:  2 mg    Date/Time Rate/Dose/Volume Action Route Admin User Audit    04/13/23 1030 2 mg Given Perineural Janusz Green MD       ceFAZolin Sodium (ANCEF) injection 2 g (g)  Total dose:  2 g Dosing weight:  55.7    Date/Time Rate/Dose/Volume Action Route Admin User Audit    04/13/23 1123 2 g Given Intravenous Iliana Kwong APRN CRNA       fentaNYL (PF) (SUBLIMAZE) injection 25-50 mcg (mcg)  Total dose:  150 mcg Dosing weight:  55.7    Date/Time Rate/Dose/Volume Action Route Admin User Audit    04/13/23 1124 25 mcg Given Intravenous Elenita, Iliana, APRN CRNA     1220 75 mcg Given Intravenous Lucille Cox APRN CRNA     1328 25 mcg Given Intravenous Elenita, Iliana, APRN CRNA     1510 25 mcg Given Intravenous Iliana Kwong APRN CRNA       glycopyrrolate 0.2 mg/mL (mg)  Total  dose:  0.1 mg    Date/Time Rate/Dose/Volume Action Route Admin User Audit    04/13/23 1135 0.1 mg Given Intravenous Elenita Iliana APRN CRNA       ePHEDrine 5 mg/mL in NS (mg)  Total dose:  10 mg    Date/Time Rate/Dose/Volume Action Route Admin User Audit    04/13/23 1138 5 mg Given Intravenous Elenita, Iliana, APRN CRNA     1146 5 mg Given Intravenous Elenita, Iliana, APRN CRNA       0.75% Hyperbaric Bupivacaine (Intrathecal) (mL)  Total volume:  1.6 mL    Date/Time Rate/Dose/Volume Action Route Admin User Audit    04/13/23 1130 1.6 mL Given Intrathecal Behrens, Christopher J, MD       midazolam (VERSED) injection 1-2 mg (mg)  Total dose:  2 mg Dosing weight:  55.7    Date/Time Rate/Dose/Volume Action Route Admin User Audit    04/13/23 1124 1 mg Given Intravenous Elenita, Iliana, APRN CRNA     1220 1 mg Given Intravenous Kenny Lucille L, APRN CRNA       LR (mL)  Total volume:  1,700 mL    Date/Time Rate/Dose/Volume Action Route Admin User Audit    04/13/23 1100  New Bag Intravenous ElenitaGianaIliana, APRN CRNA     1150 700 mL Anesthesia Volume Adjustment Intravenous Elenita, Iliana, APRN CRNA     1230 300 mL New Bag Intravenous Kenny Lucille L, APRN CRNA     1340 300 mL Anesthesia Volume Adjustment Intravenous Elenita, Iliana, APRN CRNA     1430 200 mL Anesthesia Volume Adjustment Intravenous Elenita, Iliana, APRN CRNA     1527 200 mL Anesthesia Volume Adjustment Intravenous Elenita, Iliana, APRN CRNA          Local Meds Yes - Local Cocktail (morphine, ropivacaine, epinephrine, Toradol)   Antibiotics cefazolin (Ancef) - last given at 1123     Pain Patient Currently in Pain: denies   PACU meds  fentanyl (Sublimaze): 25 mcg (total dose) last given at 1721   oxycodone (Roxicodone): 5 mg (total dose) last given at 1813    PCA / epidural epidural d/c'd during procedure   Capnography     Telemetry ECG Rhythm: Sinus bradycardia   Inpatient Telemetry Monitor Ordered? No        Labs Glucose No results found for: GLC    Hgb No results  "found for: HGB    INR No results found for: INR   PACU Imaging Not applicable     Wound/Incision Incision/Surgical Site 04/13/23 Right;Anterior Knee (Active)   Incision Assessment UTV 04/13/23 1600   Sudha-Incision Assessment UTV 04/13/23 1519   Incision Drainage Amount None 04/13/23 1600   Dressing Intervention Clean, dry, intact 04/13/23 1600   Number of days: 0       Incision/Surgical Site 04/13/23 Right;Lateral Knee (Active)   Incision Assessment UTV 04/13/23 1600   Sudha-Incision Assessment UTV 04/13/23 1519   Incision Drainage Amount None 04/13/23 1600   Dressing Intervention Clean, dry, intact 04/13/23 1600   Number of days: 0      CMS        Equipment Not applicable   Other LDA       IV Access Peripheral IV 04/13/23 Left Hand (Active)   Site Assessment WDL 04/13/23 1600   Line Status Infusing 04/13/23 1600   Dressing Transparent 04/13/23 1600   Dressing Status clean;dry;intact 04/13/23 1600   Phlebitis Scale 0-->no symptoms 04/13/23 1600   Number of days: 0      Blood Products Not applicable EBL 50 mL   Intake/Output Date 04/13/23 0700 - 04/14/23 0659   Shift 1022-7502 4940-4177 6882-7431 24 Hour Total   INTAKE   I.V. 1500 200  1700   Shift Total(mL/kg) 1500(27.62) 200(3.68)  1700(31.31)   OUTPUT   Urine  850  850   Blood 50   50   Shift Total(mL/kg) 50(0.92) 850(15.65)  900(16.57)   Weight (kg) 54.3 54.3 54.3 54.3      Drains / [place]     Time of void PreOp Time of Void Prior to Procedure: 0915 (04/13/23 0939)    PostOp Straight cath: 850 mL (04/13/23 1650)    Diapered? No   Bladder Scan Bladder Scan Volume (mL): 661 ml (\"greater than\") (04/13/23 1624)   PO    crackers, water and ice chips     Vitals    B/P: 122/81  T: 98.6  F (37  C)    Temp src: Oral  P:  Pulse: (!) 48 (04/13/23 1815)          R: 16  O2:  SpO2: 100 %    O2 Device: None (Room air) (04/13/23 3623)    Oxygen Delivery: 4 LPM (04/13/23 0635)         Family/support present mother and father   Patient belongings     Patient transported on cart   DC " meds/scripts (obs/outpt) Yes, meds   Inpatient Pain Meds Released? Yes       Special needs/considerations None   Tasks needing completion None       Cami Beck RN  ASCOM [id number]

## 2023-04-14 NOTE — TELEPHONE ENCOUNTER
[facility] Call Center    Phone Message:     Pt's mother called, requesting a CALL BACK (before the weekend), to discuss and go over pt's POST OP shower instructions (clarification of instructions is needed). Please CALL pt's mother, as soon as possible. Thank you.    Reason for Call: Other: POST OP Shower Instructions, CALL BACK     Action Taken: Message routed to:  [facility] ([facility]): Team    Travel Screening: Not Applicable

## 2023-04-14 NOTE — PLAN OF CARE
Physical Therapy Discharge Summary    Reason for therapy discharge:    All goals and outcomes met, no further needs identified.    Progress towards therapy goal(s). See goals on Care Plan in [facility] electronic health record for goal details.  Goals met    Therapy recommendation(s):    Continued therapy is recommended.  Rationale/Recommendations:  Pt plans to start outpatient PT on Tues.

## 2023-04-14 NOTE — PHARMACY-ADMISSION MEDICATION HISTORY
Admission Medication History Completed by Pharmacy    See [facility] Admission Navigator for allergy information, preferred outpatient pharmacy, prior to admission medications and immunization status.     Medication history sources:  care everywhere    Pertinent changes made to PTA medication list:  Added: Albuterol  Deleted: Xopenex  Changed: None     Additional medication history information:     Prior to Admission medications    Medication Sig Last Dose Taking? Auth Provider Long Term End Date   acetaminophen (TYLENOL) 325 MG tablet Take 2 tablets (650 mg) by mouth every 4 hours as needed for other (mild pain)  Yes Radha Sharp PA-C     albuterol (PROAIR HFA/PROVENTIL HFA/VENTOLIN HFA) 108 (90 Base) MCG/ACT inhaler Inhale 2 puffs into the lungs every 6 hours as needed for shortness of breath, wheezing or cough  Yes Unknown, Entered By History Yes    aspirin 81 MG EC tablet Take 1 tablet (81 mg) by mouth 2 times daily (with meals)  Yes Radha Sharp PA-C     budesonide-formoterol (SYMBICORT) 160-4.5 MCG/ACT Inhaler Inhale 2 puffs into the lungs 2 times daily 4/13/2023 at 0630 Yes Reported, Patient Yes 11/8/23   oxyCODONE (ROXICODONE) 5 MG tablet Take 1-2 tablets (5-10 mg) by mouth every 4 hours as needed for moderate to severe pain  Yes Radha Sharp PA-C     senna-docusate (SENOKOT-S/PERICOLACE) 8.6-50 MG tablet Take 1-2 tablets by mouth 2 times daily Take while on oral narcotics to prevent or treat constipation.  Yes Radha Sharp PA-C            Date completed: 04/13/23    Medication history completed by:   Angeline Saldana, [facility]  PharmD,BCPS  April 13, 2023

## 2023-04-14 NOTE — PLAN OF CARE
Occupational Therapy: Orders received. Chart reviewed and discussed with care team.? Occupational Therapy not indicated as patient working with PT and mobilizing well, has walk in shower with bench, and support as needed.? Defer discharge recommendations to PT/ortho.? Will complete orders.

## 2023-04-15 NOTE — DISCHARGE SUMMARY
ORTHOPAEDIC SURGERY DISCHARGE SUMMARY     Date of Admission: 4/13/2023  Date of Discharge: 4/14/2023  1:30 PM  Disposition: Home  Staff Physician: No att. providers found  Primary Care Provider: Shantell Cooper    DISCHARGE DIAGNOSIS:  Patellar instability of right knee [M25.361]    PROCEDURES: Procedure(s):  Right Medial patellofemoral ligament reconstuction with allograft,arthroscopy, and patella chondroplasty  trochleoplasty, ACI Cartilage Biposy  lateral retinacular lengthening  on 4/13/2023    BRIEF HISTORY:  This is a 23 year old patient who has been followed in clinic. Please refer to that documentation for full details. Briefly, the patient has a history of right patella instability. The patient has failed conservative treatment of symptoms and desires more definitive intervention. Therefore, after reviewing non-operative and operative options including the risks and benefits associated with each, the patient elected to proceed with the above stated procedure.     HOSPITAL COURSE:    The patient was admitted following the above listed procedures for pain control and rehabilitation. Jordana Kern did well post-operatively. . The patient received routine nursing cares and at the time of discharge was medically stable. Vital signs were stable throughout admission. The patient is tolerating a regular diet and is voiding spontaneously. All PT/OT goals have been met for safe mobility. Pain is now controlled on oral medications which will be available on discharge. Stool softeners have been used while taking pain medications to help prevent constipation. Jordana Kern is deemed medically safe to discharge.     Antibiotics:  Ancef given periop and 24 hours postop.  DVT prophylaxis:  Mechanical  PT Progress:  Has met PT/OT goals for safe mobility.    Pain Meds:  Weaned off all IV pain meds by discharge.  Inpatient Events: No significant events or complications.     PHYSICAL EXAM:    General: NAD, alert and oriented,  cooperative with exam.   Cardio: RRR, extremities wwp.   Respiratory: Non-labored breathing.  MSK: RLE: Toes wwp, DP 2+, bcr in all toes.  +EHL/FHL/GSC/TA. SILT SP/DP/Sa/Del Angel/T. Dressing c/d/i.     FOLLOWUP:    Follow up with Dr. Marie at 2 weeks postoperatively.    Future Appointments   Date Time Provider Department Center   4/28/2023  2:40 PM Radha Sharp PA-C [facility]   5/16/2023  3:20 PM Ksenia Marie MD [facility]       Orthopaedic Surgery appointments are at the [facility] [place] ([address] [place] [address]). Call [phone] to schedule a follow-up appointment at this location with your provider.     PLANNED DISCHARGE ORDERS:    PWB, <50% toe touch weight bearing to operative extremity with hinged knee brace on and locked at 10 degrees and assistive devices as needed    Hinged knee brace is to be set at 10 degrees to 90 degrees; lock at 10 degrees flexion while ambulating; the brace is to be worn at all times except with range of motion exercises and CPM use    CPM to be set at 10 degrees to flexion tolerance with goal of 90 degrees; advance aggressively in 5 degree increments as tolerated by the patient; emphasize full extension about knee    PT as outpatient; an order and PT protocol will be provided to the patient at time of discharge    ADAT    Pain control with orals prn    Bowel prophylaxis with senna-docusate    DVT prophylaxis: ASA + mechanical         Discharge Medication List as of 4/14/2023 10:32 AM      START taking these medications    Details   acetaminophen (TYLENOL) 325 MG tablet Take 2 tablets (650 mg) by mouth every 4 hours as needed for other (mild pain), Disp-100 tablet, R-0, E-Prescribe      aspirin 81 MG EC tablet Take 1 tablet (81 mg) by mouth 2 times daily (with meals), Disp-60 tablet, R-0, E-Prescribe      fluticasone-vilanterol (BREO ELLIPTA) 200-25 MCG/ACT inhaler Inhale 1 puff into the lungs daily, Historical      methocarbamol  "(ROBAXIN) 750 MG tablet Take 1 tablet (750 mg) by mouth every 6 hours as needed for muscle spasms, Disp-40 tablet, R-0, E-Prescribe      oxyCODONE (ROXICODONE) 5 MG tablet Take 1-2 tablets (5-10 mg) by mouth every 4 hours as needed for moderate to severe pain, Disp-26 tablet, R-0, E-Prescribe      polyethylene glycol (MIRALAX) 17 g packet Take 17 g by mouth daily, Disp-10 packet, R-0, E-Prescribe      senna-docusate (SENOKOT-S/PERICOLACE) 8.6-50 MG tablet Take 1-2 tablets by mouth 2 times daily Take while on oral narcotics to prevent or treat constipation., Disp-30 tablet, R-0, E-PrescribeWhile taking narcotics         CONTINUE these medications which have NOT CHANGED    Details   albuterol (PROAIR HFA/PROVENTIL HFA/VENTOLIN HFA) 108 (90 Base) MCG/ACT inhaler Inhale 2 puffs into the lungs every 6 hours as needed for shortness of breath, wheezing or cough, Historical      budesonide-formoterol (SYMBICORT) 160-4.5 MCG/ACT Inhaler Inhale 2 puffs into the lungs 2 times daily, Historical               Discharge Procedure Orders   Referral Order to Outpatient Physical Therapy   Standing Status: Future   Referral Priority: Routine Referral Type: Rehab Therapy Physical Therapy   Number of Visits Requested: 1     Reason for your hospital stay   Order Comments: Right knee surgery.     When to call - Contact Surgeon Team   Order Comments: You may experience symptoms that require follow-up before your scheduled appointment. Refer to the \"Stoplight Tool\" for instructions on when to contact your Surgeon Team if you are concerned about pain control, blood clots, constipation, or if you are unable to urinate.     When to call - Reach out to Urgent Care   Order Comments: If you are not able to reach your Surgeon Team and you need immediate care, go to the Orthopedic Walk-in Clinic or Urgent Care at your Surgeon's office.  Do NOT go to the Emergency Room unless you have shortness of breath, chest pain, or other signs of a medical " emergency.     When to call - Reasons to Call 911   Order Comments: Call 911 immediately if you experience sudden-onset chest pain, arm weakness/numbness, slurred speech, or shortness of breath     Discharge Instruction - Breathing exercises   Order Comments: Perform breathing exercises using your Incentive Spirometer 10 times per hour while awake for 2 weeks.     Symptoms - Fever Management   Order Comments: A low grade fever can be expected after surgery.  Use acetaminophen (TYLENOL) as needed for fever management.  Contact your Surgeon Team if you have a fever greater than 101.5 F, chills, and/or night sweats.     Symptoms - Constipation management   Order Comments: Constipation (hard, dry bowel movements) is expected after surgery due to the combination of being less active, the anesthetic, and the opioid pain medication.  You can do the following to help reduce constipation:  ~  FLUIDS:  Drink clear liquids (water or Gatorade), or juice (apple/prune).  ~  DIET:  Eat a fiber rich diet.    ~  ACTIVITY:  Get up and move around several times a day.  Increase your activity as you are able.  MEDICATIONS:  Reduce the risk of constipation by starting medications before you are constipated.  You can take Miralax   (1 packet as directed) and/or a stool softener (Senokot 1-2 tablets 1-2 times a day).  If you already have constipation and these medications are not working, you can get magnesium citrate and use as directed.  If you continue to have constipation you can try an over the counter suppository or enema.  Call your Surgeon Team if it has been greater than 3 days since your last bowel movement.     Symptoms - Reduced Urine Output   Order Comments: Changes in the amount of fluids you drank before and after surgery may result in problems urinating.  It is important to stay well-hydrated after surgery and drink plenty of water. If it has been greater than 8 hours since you have urinated despite drinking plenty of  water, call your Surgeon Team.     Activity - Exercises to prevent blood clots   Order Comments: Unless otherwise directed by your Surgeon team, perform the following exercises at least three times per day for the first four weeks after surgery to prevent blood clots in your legs: 1) Point and flex your feet (Ankle Pumps), 2) Move your ankle around in big circles, 3) Wiggle your toes, 4) Walk, even for short distances, several times a day, will help decrease the risk of blood clots.     Order Specific Question Answer Comments   Is discharge order? Yes      Comfort and Pain Management - Pain after Surgery   Order Comments: Pain after surgery is normal and expected.  You will have some amount of pain for several weeks after surgery.  Your pain will improve with time.  There are several things you can do to help reduce your pain including: rest, ice, elevation, and using pain medications as needed. Contact your Surgeon Team if you have pain that persists or worsens after surgery despite rest, ice, elevation, and taking your medication(s) as prescribed. Contact your Surgeon Team if you have new numbness, tingling, or weakness in your operative extremity.     Comfort and Pain Management - Swelling after Surgery   Order Comments: Swelling and/or bruising of the surgical extremity is common and may persist for several months after surgery. In addition to frequent icing and elevation, gentle compressive support with an ACE wrap or tubigrip may help with swelling. Apply compression regularly, removing at least twice daily to perform skin checks. Contact your Surgeon Team if your swelling increases and is NOT associated with an increase in your activity level, or if your swelling increases and is associated with redness and pain.     Comfort and Pain Management - Cold therapy   Order Comments: Ice can be used to control swelling and discomfort after surgery. Place a thin towel over your operative site and apply the ice pack  overtop. Leave ice pack in place for 20 minutes, then remove for 20 minutes. Repeat this 20 minutes on/20 minutes off routine as often as tolerated.     Medication Instructions - Acetaminophen (TYLENOL) Instructions   Order Comments: You were discharged with acetaminophen (TYLENOL) for pain management after surgery. Acetaminophen most effectively manages pain symptoms when it is taken on a schedule without missing doses (every four, six, or eight hours). Your Provider will prescribe a safe daily dose between 3000 - 4000 mg.  Do NOT exceed this daily dose. Most patients use acetaminophen for pain control for the first four weeks after surgery.  You can wean from this medication as your pain decreases.     Medication Instructions - NSAID Instructions   Order Comments: You were discharged with an anti-inflammatory medication for pain management to use in combination with acetaminophen (TYLENOL) and the narcotic pain medication.  Take this medication exactly as directed.  You should only take one anti-inflammatory at a time.  Some common anti-inflammatories include: ibuprofen (ADVIL, MOTRIN), naproxen (ALEVE, NAPROSYN), celecoxib (CELEBREX), meloxicam (MOBIC), ketorolac (TORADOL).  Take this medication with food and water.     Medication Instructions - Opioids - Tapering Instructions   Order Comments: In the first three days following surgery, your symptoms may warrant use of the narcotic pain medication every four to six hours as prescribed. This is normal. As your pain symptoms improve, focus your efforts on decreasing (tapering) use of narcotic medications. The most successful tapering strategy is to first, decrease the number of tablets you take every 4-6 hours to the minimum prescribed. Then, increase the amount of time between doses.  For example:  First, taper to   or 1 tablet every 4-6 hours.  Then, taper to   or 1 tablet every 6-8 hours.  Then, taper to   or 1 tablet every 8-10 hours.  Then, taper to   or 1  "tablet every 10-12 hours.  Then, taper to   or 1 tablet at bedtime.  The bedtime dose can help with comfort during sleep and is typically the last dose to be discontinued after surgery.     Follow Up Care   Order Comments: Follow-up with your Surgeon Team in 2 weeks for wound check and 4 weeks for recheck with Dr. Marie.     Medication instructions -  Anticoagulation - aspirin   Order Comments: Take the aspirin as prescribed for a total of four weeks after surgery.  This is given to help minimize your risk of blood clot.     Comfort and Pain Management - LOWER Extremity Elevation   Order Comments: Swelling is expected for several months after surgery. This type of swelling is usually associated with gravity and activity, and can be improved with elevation.   The best way to do this is to get your \"toes above your nose\" by laying down and placing several pillows lengthwise under your calf and heel. When elevating your leg keep your knee completely straight. Perform this elevation as often as possible especially for the first two weeks after surgery.     Medication Instructions - Opioid Instructions (Less than 65 years)   Order Comments: You were discharged with an opioid medication (hydromorphone, oxycodone, hydrocodone, or tramadol). This medication should only be taken for breakthrough pain that is not controlled with acetaminophen (TYLENOL). If you rate your pain less than 3 you do not need this medication.  Pain rating 0-3:  You do not need this medication.  Pain rating 4-6:  Take 1 tablet every 4-6 hours as needed  Pain rating 7-10:  Take 2 tablets every 4-6 hours as needed.  Do not exceed 6 tablets per day     Return to Driving   Order Comments: Return to driving - Driving is NOT permitted until directed by your provider. Under no circumstance are you permitted to drive while using narcotic pain medications.     Order Specific Question Answer Comments   Is discharge order? Yes      Dressing / Wound Care - " Wound   Order Comments: You have a clean dressing on your surgical wound. Dressing change instructions as follows:   Leave dressing in place for ten days following surgery. May shower over dressing starting postop day one. May remove clear dressing and silver pads after ten days and shower directely over incision thereafter. Leave white tape strips in place until incision check in clinic. IF leaking, remove and redress. Wash hands before and after and use gloves. Call Orthopedic clinic for assistance if this occurs.  Contact your Surgeon Team if you have increased redness, warmth around the surgical wound, and/or drainage from the surgical wound.     Dressing / Wound Care - NO Tub Bathing   Order Comments: Tub bathing, swimming, or any other activities that will cause your incision to be submerged in water should be avoided until allowed by your Surgeon.     Activity     Order Specific Question Answer Comments   Is discharge order? Yes      Partial weight bearing   Order Comments: Partial weight bearing on your operative extremity. <50% weightbearing with hinged knee brace locked in 10 degrees of flexion.     Order Specific Question Answer Comments   Is discharge order? Yes      Dressing Wound Care - Shower with wound/dressing NOT covered   Order Comments: You do not need to cover your dressing or incision in the shower, you may allow water and soap to run over top of the surgical dressing or incision. You may shower 2 days after surgery.  You are strictly prohibited from soaking or submerging the surgical wound underwater.     Crutches DME   Order Comments: DME Documentation: Describe the reason for need to support medical necessity: Impaired gait status post knee surgery. I, the undersigned, certify that the above prescribed supplies are medically necessary for this patient and is both reasonable and necessary in reference to accepted standards of medical practice in the treatment of this patient's condition and  is not prescribed as a convenience.     Order Specific Question Answer Comments   DME Provider: [facility]    Crutch Type: Standard    Crutches Add On: NA    Length of Need: Lifetime      Cane DME   Order Comments: DME Documentation: Describe the reason for need to support medical necessity: Impaired gait status post knee surgery. I, the undersigned, certify that the above prescribed supplies are medically necessary for this patient and is both reasonable and necessary in reference to accepted standards of medical practice in the treatment of this patient's condition and is not prescribed as a convenience.     Order Specific Question Answer Comments   DME Provider: [facility]    Cane Type: Single Tip    Reminder: Patient can typically get 1 every 5 years      Walker DME   Order Comments: DME Documentation: Describe the reason for need to support medical necessity: Impaired gait status post knee surgery. I, the undersigned, certify that the above prescribed supplies are medically necessary for this patient and is both reasonable and necessary in reference to accepted standards of medical practice in the treatment of this patient's condition and is not prescribed as a convenience.     Order Specific Question Answer Comments   DME Provider: [facility]    Walker Type: Standard (2 Wheel)    Accessories: N/A      Discharge Instruction - Regular Diet Adult   Order Comments: Return to your pre-surgery diet unless instructed otherwise     Order Specific Question Answer Comments   Is discharge order? Yes            Thank you,    Jone Peña MD   PGY1  Department of Orthopaedic Surgery

## 2023-04-18 ENCOUNTER — TELEPHONE (OUTPATIENT)
Dept: ORTHOPEDICS | Facility: CLINIC | Age: 23
End: 2023-04-18
Payer: COMMERCIAL

## 2023-04-19 ENCOUNTER — DOCUMENTATION ONLY (OUTPATIENT)
Dept: ORTHOPEDICS | Facility: CLINIC | Age: 23
End: 2023-04-19
Payer: COMMERCIAL

## 2023-04-19 NOTE — PROGRESS NOTES
Received Completed forms Yes   Faxed Forms Faxed To: [facility]  Fax Number: [phone]   Sent to [facility] (Date) 4/18/23

## 2023-04-22 ENCOUNTER — HEALTH MAINTENANCE LETTER (OUTPATIENT)
Age: 23
End: 2023-04-22

## 2023-04-27 ENCOUNTER — TRANSFERRED RECORDS (OUTPATIENT)
Dept: HEALTH INFORMATION MANAGEMENT | Facility: CLINIC | Age: 23
End: 2023-04-27
Payer: COMMERCIAL

## 2023-04-28 ENCOUNTER — OFFICE VISIT (OUTPATIENT)
Dept: ORTHOPEDICS | Facility: CLINIC | Age: 23
End: 2023-04-28
Payer: COMMERCIAL

## 2023-04-28 DIAGNOSIS — Z98.890 S/P KNEE SURGERY: Primary | ICD-10-CM

## 2023-04-28 PROCEDURE — 99024 POSTOP FOLLOW-UP VISIT: CPT | Performed by: PHYSICIAN ASSISTANT

## 2023-04-28 NOTE — LETTER
4/28/2023         RE: Jordana Kern  [address] [place] [facility] [address]        Dear Colleague,    Thank you for referring your patient, Jordana Kern, to the [facility] [place]. Please see a copy of my visit note below.    ASSESSMENT/PLAN:  Jordana Kern is a 23 year old who is status post right knee MPFL, arthroscopy, throchleoplasty, patella chondroplasty, ACI cartilage biopsy, an LRL  with Dr. Marie on 4/13/23.    The incision was examined and cleansed today. monocryl suture ends trimmed. Glue partially removed. The patient was given instructions to refrain from submerging in a tub or a pool until it is a well-healed scar. They may shower and pat dry with gentle soapy lather. She will continue PT for trochleoplasty protocol. She may WBAT in the HKB locked at 10 degrees. Motion 0-90 for first 4 weeks from surgery, no forceful flexion. She was given another copy of the trochleoplasty protocol. Continue consistent ice and compression for postop knee effusion.     The patient will see Dr. Marie as scheduled on 5/9/23. Jordana has our clinic number and will call with any questions or concerns.    Radha Sharp PA-C  Orthopaedic Surgery    _________________________________    HISTORY OF PRESENT ILLNESS:  Jordana Kern is a 23 year old female who is approximately 2 weeks status post the above procedure.    Weight bearing status/devices: WBAT in HKB  Pain level and management: pain is 1/10, currently using APAP/Ibuprofen. Has weaned from oxycodone over last 4 days.   Physical therapy & ROM: PT at [facility] in [place], working with Calli.      The patient endorses swelling around the surgical incision but denies surrounding redness. The incision has been dry, without discharge or drainage. Jordana denies recent fevers and chills, as well as any other symptoms concerning for infection.     DVT prophylaxis: ASA 162mg daily.  Patient denies calf pain or tenderness.      MEDICATIONS:    Current Outpatient Rx   Medication Sig Dispense Refill    acetaminophen (TYLENOL) 325 MG tablet Take 2 tablets (650 mg) by mouth every 4 hours as needed for other (mild pain) 100 tablet 0    albuterol (PROAIR HFA/PROVENTIL HFA/VENTOLIN HFA) 108 (90 Base) MCG/ACT inhaler Inhale 2 puffs into the lungs every 6 hours as needed for shortness of breath, wheezing or cough      aspirin 81 MG EC tablet Take 1 tablet (81 mg) by mouth 2 times daily (with meals) 60 tablet 0    budesonide-formoterol (SYMBICORT) 160-4.5 MCG/ACT Inhaler Inhale 2 puffs into the lungs 2 times daily      fluticasone-vilanterol (BREO ELLIPTA) 200-25 MCG/ACT inhaler Inhale 1 puff into the lungs daily      methocarbamol (ROBAXIN) 750 MG tablet Take 1 tablet (750 mg) by mouth every 6 hours as needed for muscle spasms 40 tablet 0    oxyCODONE (ROXICODONE) 5 MG tablet Take 1-2 tablets (5-10 mg) by mouth every 4 hours as needed for moderate to severe pain 26 tablet 0    polyethylene glycol (MIRALAX) 17 g packet Take 17 g by mouth daily 10 packet 0    senna-docusate (SENOKOT-S/PERICOLACE) 8.6-50 MG tablet Take 1-2 tablets by mouth 2 times daily Take while on oral narcotics to prevent or treat constipation. 30 tablet 0         ALLERGIES: Benadryl [diphenhydramine]       PHYSICAL EXAMINATION:   On physical examination the patient is comfortable and is in no acute distress. The affect is appropriate and breathing is non-labored.    Surgical wound: The surgical wound was exposed and found to be clean and dry, without drainage or discharge. It is well-approximated. There is mild edema around the incision. There is no erythema. The skin was appropriately warm to touch. Presence of moderate knee effusion/fluid shift wave.     ROM: 0-90  Isometric activation of the quadriceps: in tact  SLR: in tact without extensor lag.   Calf soft and nontender, no LE edema.     Motor intact distally throughout the tibial and peroneal nerve distributions, 5/5 strength with  tibialis anterior, gastrocnemius and soleus, EHL, FHL firing  Sensation intact to light touch throughout superficial peroneal, deep peroneal, tibial, saphenous, and sural nerves  Dorsalis pedis and posterior tibial pulses palpable, toes warm and well-perfused        Radha Sharp PA-C

## 2023-04-28 NOTE — NURSING NOTE
Reason For Visit:   Chief Complaint   Patient presents with     RECHECK     DOS: 4/13/23 // right Medial patellofemoral ligament reconstuction with allograft,arthroscopy, trochleoplast, possible lateral retinacular lengthening Dr. Marie       [facility] 03/30/2023 (Approximate)     Pain Assessment  Patient Currently in Pain: Yes  0-10 Pain Scale: 1  Primary Pain Location: Knee    Rita Formato, LPN

## 2023-04-28 NOTE — PROGRESS NOTES
ASSESSMENT/PLAN:  Jordana Kern is a 23 year old who is status post right knee MPFL, arthroscopy, throchleoplasty, patella chondroplasty, ACI cartilage biopsy, an LRL  with Dr. Marie on 4/13/23.    The incision was examined and cleansed today. monocryl suture ends trimmed. Glue partially removed. The patient was given instructions to refrain from submerging in a tub or a pool until it is a well-healed scar. They may shower and pat dry with gentle soapy lather. She will continue PT for trochleoplasty protocol. She may WBAT in the HKB locked at 10 degrees. Motion 0-90 for first 4 weeks from surgery, no forceful flexion. She was given another copy of the trochleoplasty protocol. Continue consistent ice and compression for postop knee effusion.     The patient will see Dr. Marie as scheduled on 5/9/23. Jordana has our clinic number and will call with any questions or concerns.    Radha Sharp PA-C  Orthopaedic Surgery    _________________________________    HISTORY OF PRESENT ILLNESS:  Jordana Kern is a 23 year old female who is approximately 2 weeks status post the above procedure.    Weight bearing status/devices: WBAT in HKB  Pain level and management: pain is 1/10, currently using APAP/Ibuprofen. Has weaned from oxycodone over last 4 days.   Physical therapy & ROM: PT at [facility] in [place], working with Calli.      The patient endorses swelling around the surgical incision but denies surrounding redness. The incision has been dry, without discharge or drainage. Jordana denies recent fevers and chills, as well as any other symptoms concerning for infection.     DVT prophylaxis: ASA 162mg daily.  Patient denies calf pain or tenderness.      MEDICATIONS:   Current Outpatient Rx   Medication Sig Dispense Refill     acetaminophen (TYLENOL) 325 MG tablet Take 2 tablets (650 mg) by mouth every 4 hours as needed for other (mild pain) 100 tablet 0     albuterol (PROAIR HFA/PROVENTIL HFA/VENTOLIN HFA) 108 (90 Base)  MCG/ACT inhaler Inhale 2 puffs into the lungs every 6 hours as needed for shortness of breath, wheezing or cough       aspirin 81 MG EC tablet Take 1 tablet (81 mg) by mouth 2 times daily (with meals) 60 tablet 0     budesonide-formoterol (SYMBICORT) 160-4.5 MCG/ACT Inhaler Inhale 2 puffs into the lungs 2 times daily       fluticasone-vilanterol (BREO ELLIPTA) 200-25 MCG/ACT inhaler Inhale 1 puff into the lungs daily       methocarbamol (ROBAXIN) 750 MG tablet Take 1 tablet (750 mg) by mouth every 6 hours as needed for muscle spasms 40 tablet 0     oxyCODONE (ROXICODONE) 5 MG tablet Take 1-2 tablets (5-10 mg) by mouth every 4 hours as needed for moderate to severe pain 26 tablet 0     polyethylene glycol (MIRALAX) 17 g packet Take 17 g by mouth daily 10 packet 0     senna-docusate (SENOKOT-S/PERICOLACE) 8.6-50 MG tablet Take 1-2 tablets by mouth 2 times daily Take while on oral narcotics to prevent or treat constipation. 30 tablet 0         ALLERGIES: Benadryl [diphenhydramine]       PHYSICAL EXAMINATION:   On physical examination the patient is comfortable and is in no acute distress. The affect is appropriate and breathing is non-labored.    Surgical wound: The surgical wound was exposed and found to be clean and dry, without drainage or discharge. It is well-approximated. There is mild edema around the incision. There is no erythema. The skin was appropriately warm to touch. Presence of moderate knee effusion/fluid shift wave.     ROM: 0-90  Isometric activation of the quadriceps: in tact  SLR: in tact without extensor lag.   Calf soft and nontender, no LE edema.     Motor intact distally throughout the tibial and peroneal nerve distributions, 5/5 strength with tibialis anterior, gastrocnemius and soleus, EHL, FHL firing  Sensation intact to light touch throughout superficial peroneal, deep peroneal, tibial, saphenous, and sural nerves  Dorsalis pedis and posterior tibial pulses palpable, toes warm and  well-perfused

## 2023-05-01 ENCOUNTER — DOCUMENTATION ONLY (OUTPATIENT)
Dept: ORTHOPEDICS | Facility: CLINIC | Age: 23
End: 2023-05-01
Payer: COMMERCIAL

## 2023-05-01 NOTE — PROGRESS NOTES
Received Completed forms Yes   Faxed Forms Faxed To: austyn elaine   Fax Number: [phone]   Sent to HIM (Date) 5/1/23

## 2023-05-04 DIAGNOSIS — Z98.890 S/P KNEE SURGERY: Primary | ICD-10-CM

## 2023-05-08 ENCOUNTER — TRANSFERRED RECORDS (OUTPATIENT)
Dept: HEALTH INFORMATION MANAGEMENT | Facility: CLINIC | Age: 23
End: 2023-05-08
Payer: COMMERCIAL

## 2023-05-09 ENCOUNTER — ANCILLARY PROCEDURE (OUTPATIENT)
Dept: GENERAL RADIOLOGY | Facility: CLINIC | Age: 23
End: 2023-05-09
Attending: ORTHOPAEDIC SURGERY
Payer: COMMERCIAL

## 2023-05-09 ENCOUNTER — OFFICE VISIT (OUTPATIENT)
Dept: ORTHOPEDICS | Facility: CLINIC | Age: 23
End: 2023-05-09
Payer: COMMERCIAL

## 2023-05-09 DIAGNOSIS — Z98.890 S/P KNEE SURGERY: ICD-10-CM

## 2023-05-09 DIAGNOSIS — Z98.890 S/P KNEE SURGERY: Primary | ICD-10-CM

## 2023-05-09 PROCEDURE — 73560 X-RAY EXAM OF KNEE 1 OR 2: CPT | Mod: RT | Performed by: RADIOLOGY

## 2023-05-09 PROCEDURE — 99024 POSTOP FOLLOW-UP VISIT: CPT | Performed by: ORTHOPAEDIC SURGERY

## 2023-05-09 NOTE — NURSING NOTE
Reason For Visit:   Chief Complaint   Patient presents with     Surgical Followup     DOS: 4/13/23 // Right Medial patellofemoral ligament reconstuction with allograft,arthroscopy, trochleoplast, possible lateral retinacular lengthening       Primary MD: Shantell Cooper  Referring MD: est     ?  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  Work status?  Full time.  Date of injury: Went to a wedding and was dancing in October 2022 and her knee went out.  Since then it has been monthly     Date of surgery: 4/13/2023  Type of surgery:   1. Right knee exam under anesthesia.   2. Diagnostic arthroscopy with patella chondroplasty  3. Trochleoplasty.   4. Lateral retinacular lengthening  6. Medial patellofemoral ligament reconstruction using gracilis allograft.  7. ROBEL cartilage biopsy    Smoker: No  Request smoking cessation information: No    LMP 03/30/2023 (Approximate)     Pain Assessment  Patient Currently in Pain: Denies

## 2023-05-09 NOTE — LETTER
5/9/2023         RE: Jordana Kern  [address] [place] [address]        Dear Colleague,    Thank you for referring your patient, Jordana Kern, to the [facility] [place]. Please see a copy of my visit note below.    ASSESSMENT/PLAN:  Jordana Kern is a 23 year old who is status post right knee MPFL, arthroscopy, throchleoplasty, patella chondroplasty, ACI cartilage biopsy, an LRL  with Dr. Marie on 4/13/23.    She has been ambulating with the knee locked at 10 degrees shy of full extension in her hinged knee brace.  Incision is well-healing at this point.  Has been a little bit frustrated by her progress with physical therapy, but has had no major issues.  No fevers or chills.  No numbness or tingling.    -Follow-up 6 weeks from today  - Continue physical therapy  - Okay to discontinue knee brace when not ambulating, can wear hinged knee brace unlocked when up and moving.    This is a post op visit    Fran Menezes MD     I have personally examined this patient and have reviewed the clinical presentation and progress note with the resident.  I agree with the treatment plan as outlined.  The plan was formulated with the resident on the day of the resident dictation.    Ksenia Marie MD          _________________________________    HISTORY OF PRESENT ILLNESS:  Jordana Kern is a 23 year old female who is approximately 4 weeks status post the above procedure.    Weight bearing status/devices: WBAT in HKB, unlocked when up and ambulating, otherwise okay to take off brace at any point in time when resting, sleeping, or sitting at home.  Pain level and management: Continue Tylenol and ibuprofen as needed for pain and swelling  Physical therapy & ROM: PT at [facility] in [place], working with Calli.        MEDICATIONS:   Current Outpatient Rx   Medication Sig Dispense Refill    acetaminophen (TYLENOL) 325 MG tablet Take 2 tablets (650 mg) by mouth every 4 hours  as needed for other (mild pain) 100 tablet 0    albuterol (PROAIR HFA/PROVENTIL HFA/VENTOLIN HFA) 108 (90 Base) MCG/ACT inhaler Inhale 2 puffs into the lungs every 6 hours as needed for shortness of breath, wheezing or cough      aspirin 81 MG EC tablet Take 1 tablet (81 mg) by mouth 2 times daily (with meals) 60 tablet 0    budesonide-formoterol (SYMBICORT) 160-4.5 MCG/ACT Inhaler Inhale 2 puffs into the lungs 2 times daily      fluticasone-vilanterol (BREO ELLIPTA) 200-25 MCG/ACT inhaler Inhale 1 puff into the lungs daily      methocarbamol (ROBAXIN) 750 MG tablet Take 1 tablet (750 mg) by mouth every 6 hours as needed for muscle spasms 40 tablet 0    oxyCODONE (ROXICODONE) 5 MG tablet Take 1-2 tablets (5-10 mg) by mouth every 4 hours as needed for moderate to severe pain (Patient not taking: Reported on 4/28/2023) 26 tablet 0    polyethylene glycol (MIRALAX) 17 g packet Take 17 g by mouth daily (Patient not taking: Reported on 4/28/2023) 10 packet 0    senna-docusate (SENOKOT-S/PERICOLACE) 8.6-50 MG tablet Take 1-2 tablets by mouth 2 times daily Take while on oral narcotics to prevent or treat constipation. (Patient not taking: Reported on 4/28/2023) 30 tablet 0         ALLERGIES: Benadryl [diphenhydramine]       PHYSICAL EXAMINATION:   On physical examination the patient is comfortable and is in no acute distress. The affect is appropriate and breathing is non-labored.    Surgical wound: Anterior midline incision is well-healed at this point, some residual skin glue in place, left in place.  There is a moderate-sized knee effusion with a positive fluid wave.  No erythema, no drainage.  Minimal tenderness with palpation about the tibiofemoral and patellofemoral joint lines.    ROM: +10- 98 degrees  Isometric activation of the quadriceps: in tact  SLR: in tact without extensor lag.   Calf soft and nontender, no LE edema.     Motor intact distally throughout the tibial and peroneal nerve distributions, 5/5 strength  with tibialis anterior, gastrocnemius and soleus, EHL, FHL firing  Sensation intact to light touch throughout superficial peroneal, deep peroneal, tibial, saphenous, and sural nerves  Dorsalis pedis and posterior tibial pulses palpable, toes warm and well-perfused

## 2023-05-09 NOTE — PROGRESS NOTES
ASSESSMENT/PLAN:  Jordana Kern is a 23 year old who is status post right knee MPFL, arthroscopy, throchleoplasty, patella chondroplasty, ACI cartilage biopsy, an LRL  with Dr. Marie on 4/13/23.    She has been ambulating with the knee locked at 10 degrees shy of full extension in her hinged knee brace.  Incision is well-healing at this point.  Has been a little bit frustrated by her progress with physical therapy, but has had no major issues.  No fevers or chills.  No numbness or tingling.    -Follow-up 6 weeks from today  - Continue physical therapy  - Okay to discontinue knee brace when not ambulating, can wear hinged knee brace unlocked when up and moving.    This is a post op visit    Fran Menezes MD     I have personally examined this patient and have reviewed the clinical presentation and progress note with the resident.  I agree with the treatment plan as outlined.  The plan was formulated with the resident on the day of the resident dictation.    Ksenia Marie MD          _________________________________    HISTORY OF PRESENT ILLNESS:  Jordana Kern is a 23 year old female who is approximately 4 weeks status post the above procedure.    Weight bearing status/devices: WBAT in HKB, unlocked when up and ambulating, otherwise okay to take off brace at any point in time when resting, sleeping, or sitting at home.  Pain level and management: Continue Tylenol and ibuprofen as needed for pain and swelling  Physical therapy & ROM: PT at [facility] in [place], working with Calli.        MEDICATIONS:   Current Outpatient Rx   Medication Sig Dispense Refill     acetaminophen (TYLENOL) 325 MG tablet Take 2 tablets (650 mg) by mouth every 4 hours as needed for other (mild pain) 100 tablet 0     albuterol (PROAIR HFA/PROVENTIL HFA/VENTOLIN HFA) 108 (90 Base) MCG/ACT inhaler Inhale 2 puffs into the lungs every 6 hours as needed for shortness of breath, wheezing or cough       aspirin 81 MG EC tablet  Take 1 tablet (81 mg) by mouth 2 times daily (with meals) 60 tablet 0     budesonide-formoterol (SYMBICORT) 160-4.5 MCG/ACT Inhaler Inhale 2 puffs into the lungs 2 times daily       fluticasone-vilanterol (BREO ELLIPTA) 200-25 MCG/ACT inhaler Inhale 1 puff into the lungs daily       methocarbamol (ROBAXIN) 750 MG tablet Take 1 tablet (750 mg) by mouth every 6 hours as needed for muscle spasms 40 tablet 0     oxyCODONE (ROXICODONE) 5 MG tablet Take 1-2 tablets (5-10 mg) by mouth every 4 hours as needed for moderate to severe pain (Patient not taking: Reported on 4/28/2023) 26 tablet 0     polyethylene glycol (MIRALAX) 17 g packet Take 17 g by mouth daily (Patient not taking: Reported on 4/28/2023) 10 packet 0     senna-docusate (SENOKOT-S/PERICOLACE) 8.6-50 MG tablet Take 1-2 tablets by mouth 2 times daily Take while on oral narcotics to prevent or treat constipation. (Patient not taking: Reported on 4/28/2023) 30 tablet 0         ALLERGIES: Benadryl [diphenhydramine]       PHYSICAL EXAMINATION:   On physical examination the patient is comfortable and is in no acute distress. The affect is appropriate and breathing is non-labored.    Surgical wound: Anterior midline incision is well-healed at this point, some residual skin glue in place, left in place.  There is a moderate-sized knee effusion with a positive fluid wave.  No erythema, no drainage.  Minimal tenderness with palpation about the tibiofemoral and patellofemoral joint lines.    ROM: +10- 98 degrees  Isometric activation of the quadriceps: in tact  SLR: in tact without extensor lag.   Calf soft and nontender, no LE edema.     Motor intact distally throughout the tibial and peroneal nerve distributions, 5/5 strength with tibialis anterior, gastrocnemius and soleus, EHL, FHL firing  Sensation intact to light touch throughout superficial peroneal, deep peroneal, tibial, saphenous, and sural nerves  Dorsalis pedis and posterior tibial pulses palpable, toes warm  and well-perfused

## 2023-05-18 ENCOUNTER — DOCUMENTATION ONLY (OUTPATIENT)
Dept: ORTHOPEDICS | Facility: CLINIC | Age: 23
End: 2023-05-18
Payer: COMMERCIAL

## 2023-05-18 NOTE — PROGRESS NOTES
Received Completed forms Yes   Faxed Forms Faxed To: austyn  Fax Number: [phone]   Sent to HIM (Date) 5/17/23

## 2023-06-06 ENCOUNTER — DOCUMENTATION ONLY (OUTPATIENT)
Dept: ORTHOPEDICS | Facility: CLINIC | Age: 23
End: 2023-06-06

## 2023-06-06 DIAGNOSIS — Z98.890 S/P KNEE SURGERY: Primary | ICD-10-CM

## 2023-06-06 DIAGNOSIS — M25.361 PATELLAR INSTABILITY OF RIGHT KNEE: ICD-10-CM

## 2023-06-07 ENCOUNTER — DOCUMENTATION ONLY (OUTPATIENT)
Dept: ORTHOPEDICS | Facility: CLINIC | Age: 23
End: 2023-06-07
Payer: COMMERCIAL

## 2023-06-07 NOTE — PROGRESS NOTES
Received Completed forms Yes   Faxed Forms Faxed To: austyn elaine  Fax Number: [phone]   Sent to HIM (Date) 6/6/23

## 2023-06-13 ENCOUNTER — OFFICE VISIT (OUTPATIENT)
Dept: ORTHOPEDICS | Facility: CLINIC | Age: 23
End: 2023-06-13
Payer: COMMERCIAL

## 2023-06-13 DIAGNOSIS — Z98.890 S/P KNEE SURGERY: Primary | ICD-10-CM

## 2023-06-13 PROCEDURE — 99024 POSTOP FOLLOW-UP VISIT: CPT | Performed by: ORTHOPAEDIC SURGERY

## 2023-06-13 NOTE — PROGRESS NOTES
Patient is a 23-year-old female who is now 2 months status post a right MPFL reconstruction/trochlear plasty.  She continues to wear the brace, fully open.  She continues to go to therapy twice a week but think she can drop down to once a week.  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    She has slowly increase her walking.  She has been walking with her mother daily.  They walk approximately 2 miles.  This is with the brace on but open.    She would like to return to work after 4 July.  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  At this point time she feels that with a few more weeks of strengthening she would be able to do the job without restrictions.  Her disability sheet was filled out accordingly.    Physical exam of patient reveals benign-appearing incision fullness about the knee without a fluid wave good straight leg raising effort without a lag.  Range of motion 0-1 20 with positive crepitus in early flexion.  There is normal tracking with a complete illumination of the J sign and open chain activities.    Assessment:  Excellent early postoperative results    Plan: . 1.  patient will return to work on July 7, with no restrictions.  2.  Patient does not need to wear the brace at this point in time.  She does have knee sleeves at home that she can substitute as desires  3.  She will follow-up with me somewhere between 2 and 3 months depending on her schedule when she returns back to work.  She will contact me once her schedule is known.  4.  No new x-rays need to be done on her right knee.  However prior to seeing me she will obtain a left knee MRI.    This is a postop visit    Ksenia Marie MD  Professor Orthopedic Surgery  [facility]

## 2023-06-13 NOTE — NURSING NOTE
Reason For Visit:   Chief Complaint   Patient presents with     Surgical Followup     DOS: 4/13/23 // Medial patellofemoral ligament reconstuction with allograft,arthroscopy, trochleoplast, possible lateral retinacular lengthening        Primary MD: Shantell Cooper  Referring MD: est     ?  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  Work status?  Full time.  Date of injury: Went to a wedding and was dancing in October 2022 and her knee went out.  Since then it has been monthly     Date of surgery: 4/13/2023  Type of surgery:   1. Right knee exam under anesthesia.   2. Diagnostic arthroscopy with patella chondroplasty  3. Trochleoplasty.   4. Lateral retinacular lengthening  6. Medial patellofemoral ligament reconstruction using gracilis allograft.  7. ROBEL cartilage biopsy     Smoker: No  Request smoking cessation information: No    LMP 03/30/2023 (Approximate)     Pain Assessment  Patient Currently in Pain: Denies

## 2023-06-13 NOTE — LETTER
6/13/2023         RE: Jordana Kern  [address] [place] [address]        Dear Colleague,    Thank you for referring your patient, Jordana Kern, to the [facility]. Please see a copy of my visit note below.    Patient is a 23-year-old female who is now 2 months status post a right MPFL reconstruction/trochlear plasty.  She continues to wear the brace, fully open.  She continues to go to therapy twice a week but think she can drop down to once a week.  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    She has slowly increase her walking.  She has been walking with her mother daily.  They walk approximately 2 miles.  This is with the brace on but open.    She would like to return to work after 4 July.  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  At this point time she feels that with a few more weeks of strengthening she would be able to do the job without restrictions.  Her disability sheet was filled out accordingly.    Physical exam of patient reveals benign-appearing incision fullness about the knee without a fluid wave good straight leg raising effort without a lag.  Range of motion 0-1 20 with positive crepitus in early flexion.  There is normal tracking with a complete illumination of the J sign and open chain activities.    Assessment:  Excellent early postoperative results    Plan: . 1.  patient will return to work on July 7, with no restrictions.  2.  Patient does not need to wear the brace at this point in time.  She does have knee sleeves at home that she can substitute as desires  3.  She will follow-up with me somewhere between 2 and 3 months depending on her schedule when she returns back to work.  She will contact me once her schedule is known.  4.  No new x-rays need to be done on her right knee.  However prior to seeing me she will obtain a left knee MRI.    This  is a postop visit    Ksenia Marie MD  Professor Orthopedic Surgery  [facility]

## 2023-07-15 ENCOUNTER — HEALTH MAINTENANCE LETTER (OUTPATIENT)
Age: 23
End: 2023-07-15

## 2023-08-22 DIAGNOSIS — Z98.890 S/P KNEE SURGERY: Primary | ICD-10-CM

## 2023-08-23 ENCOUNTER — TRANSFERRED RECORDS (OUTPATIENT)
Dept: HEALTH INFORMATION MANAGEMENT | Facility: CLINIC | Age: 23
End: 2023-08-23
Payer: COMMERCIAL

## 2023-10-31 ENCOUNTER — OFFICE VISIT (OUTPATIENT)
Dept: ORTHOPEDICS | Facility: CLINIC | Age: 23
End: 2023-10-31
Payer: COMMERCIAL

## 2023-10-31 DIAGNOSIS — M17.11 PATELLOFEMORAL ARTHRITIS OF RIGHT KNEE: Primary | ICD-10-CM

## 2023-10-31 PROCEDURE — 99213 OFFICE O/P EST LOW 20 MIN: CPT | Mod: 25 | Performed by: ORTHOPAEDIC SURGERY

## 2023-10-31 PROCEDURE — 20610 DRAIN/INJ JOINT/BURSA W/O US: CPT | Mod: RT | Performed by: ORTHOPAEDIC SURGERY

## 2023-10-31 RX ORDER — LIDOCAINE HYDROCHLORIDE 10 MG/ML
9 INJECTION, SOLUTION EPIDURAL; INFILTRATION; INTRACAUDAL; PERINEURAL
Status: SHIPPED | OUTPATIENT
Start: 2023-10-31

## 2023-10-31 RX ORDER — TRIAMCINOLONE ACETONIDE 40 MG/ML
40 INJECTION, SUSPENSION INTRA-ARTICULAR; INTRAMUSCULAR
Status: SHIPPED | OUTPATIENT
Start: 2023-10-31

## 2023-10-31 RX ADMIN — TRIAMCINOLONE ACETONIDE 40 MG: 40 INJECTION, SUSPENSION INTRA-ARTICULAR; INTRAMUSCULAR at 10:54

## 2023-10-31 RX ADMIN — LIDOCAINE HYDROCHLORIDE 9 ML: 10 INJECTION, SOLUTION EPIDURAL; INFILTRATION; INTRACAUDAL; PERINEURAL at 10:54

## 2023-10-31 NOTE — LETTER
10/31/2023         RE: Jordana Kern  [address] [facility] [address]        Dear Colleague,    Thank you for referring your patient, Jordana Kern, to the [facility]. Please see a copy of my visit note below.    Large Joint Injection/Arthocentesis: R knee joint    Date/Time: 10/31/2023 10:54 AM    Performed by: Ksenia Marie MD  Authorized by: Ksenia Marie MD    Indications:  Pain and osteoarthritis  Needle Size:  21 G  Guidance: landmark guided    Approach:  Medial  Location:  Knee      Medications:  40 mg triamcinolone 40 MG/ML; 9 mL lidocaine (PF) 1 %  Outcome:  Tolerated well, no immediate complications  Procedure discussed: discussed risks, benefits, and alternatives    Consent Given by:  Patient  Timeout: timeout called immediately prior to procedure    Prep: patient was prepped and draped in usual sterile fashion     HISTORY OF PRESENT ILLNESS:  Jordana Kern is a 23 year old female with knee pain.  Diagnosis is knee arthritis supported by history, physical exam, and imaging.    PROCEDURE:  After informed verbal and writtten consent, under sterile conditions, patient's right knee was injected with 9 cc of Lidocaine and 1 cc of Kenalog (40 mg/ml).   The injection was done by Dr. Marie.    Patient had good pain relief upon leaving the clinic, and will follow up with us on an as needed basis.      [facility] [address] [address] [phone]  Dept: [phone]  ______________________________________________________________________________    Patient: Jordana Kern   : 2000   MRN: [id number]   2023    INVASIVE PROCEDURE SAFETY CHECKLIST    Date: 10/31/2023   Procedure:Right knee steroid injection  Patient Name: Jordana Kern  MRN: [id number]  YOB: 2000    Action: Complete sections as appropriate. Any discrepancy results in a HARD COPY until  resolved.     PRE PROCEDURE:  Patient ID verified with 2 identifiers (name and  or MRN): Yes  Procedure and site verified with patient/designee (when able): Yes  Accurate consent documentation in medical record: Yes  H&P (or appropriate assessment) documented in medical record: NA  H&P must be up to 20 days prior to procedure and updates within 24 hours of procedure as applicable: NA  Relevant diagnostic and radiology test results appropriately labeled and displayed as applicable: Yes  Procedure site(s) marked with provider initials: NA    TIMEOUT:  Time-Out performed immediately prior to starting procedure, including verbal and active participation of all team members addressing the following:Yes  * Correct patient identify  * Confirmed that the correct side and site are marked  * An accurate procedure consent form  * Agreement on the procedure to be done  * Correct patient position  * Relevant images and results are properly labeled and appropriately displayed  * The need to administer antibiotics or fluids for irrigation purposes during the procedure as applicable   * Safety precautions based on patient history or medication use    DURING PROCEDURE: Verification of correct person, site, and procedures any time the responsibility for care of the patient is transferred to another member of the care team.     The following medications were given:     Prior to injection, verified patient identity using patient's name and date of birth.  Due to injection administration, patient instructed to remain in clinic for 15 minutes  afterwards, and to report any adverse reaction to me immediately.    Joint injection was performed.    Medication Name: Lidocaine NDC 88711-593-84  Drug Amount Wasted:  Yes: 11 mg/ml   Vial/Syringe: Single dose vial  Expiration Date:  2025    Medication Name Kenolog NDC [address]-8    Scribed by Lucero Linn ATC for Dr. Marie on 2023 at 10:55a based on the provider's statements  to me.     MAX Sorenson MD  Professor Orthopedic Surgery  [facility]

## 2023-10-31 NOTE — PROGRESS NOTES
"Patient is a 23-year-old female who is here with her mother.  She is now 7 months status post right MPFL reconstruction, trochlear plasty, with a ROBEL  biopsy.    Overall she is doing okay.  Though she reports no pain at the moment she does feel that she has pain when she does bent knee activities.  She also has crepitus that scares her as she feels that her kneecap is going to dislocate again.    She does admit to a fair amount of anxiety about many things in life but also anxiety about her knee.    Though this is a routine follow-up she does have some concerns about the future of this knee.    Physical exam of patient's right knee reveals benign-appearing incision some fullness about the knee without a bobo fluid wave.  Positive quad atrophy persists.  Good straight leg raising effort without a lag.  Patient has modest crepitus to early active flexion.  No J sign is present.    Passive patella mobility 1+ quadrant lateral mobility with a very firm endpoint.    Interoperative images were reviewed.  Patient has significant cartilage wear that is in a global centralized region across the inferior patella with minimum good cartilage remaining as a superior cartilage On the otherwise grade 3-4 changes of cartilage wear.    Assessment: Patient is doing well postoperatively from a motion and stability point of view.  I believe that continued strengthening will likely improve her confidence, but the other is just to try to believe that this kneecap is stable.    She wanted to know if she tried to do things even with crepitus would this \"wrecked things\".  I believe that her patella already has grade 3-4 changes in although she may increase her symptoms I do not believe that this will change what ever neck step we do for this knee.  I would not personally advise going back to running but she loves running and wants to consider trying it    I told her it might be better for her to consider some kind of running in an " antigravity situation and physical therapy if she has a place in [place] that can accommodate this, or it may be consider jogging in the water first before she does any kind of running.  When she does run I would strongly encourage indoor running for the winter and starting out on the treadmill first    I think the next step for this knee should it continue to be bothersome to her would be an osteochondral allograft for the patella.  I was able to have Dr. Sifuentes step in between cases today, and although we did an ROBEL biopsy at the time of surgery, I believe that an osteochondral allograft would be a more satisfying procedure for the short and long-term outcome of this knee, and actually from a postoperative standpoint is a little quicker recovery.    I would like to try a single steroid injection today to see if we can quiet down this knee and see if that changes at all her crepitus and/or her knee confidence.  I would consider this only once in this young of the knee.    She has consented to the injection.  We will have a follow-up visit virtually with her in 3 months time or sooner should she want to further discuss osteochondral surgical intervention    Ksenia Marie MD  Professor Orthopedic Surgery  [facility]

## 2023-10-31 NOTE — PROGRESS NOTES
Large Joint Injection/Arthocentesis: R knee joint    Date/Time: 10/31/2023 10:54 AM    Performed by: Ksenia Marie MD  Authorized by: Ksenia Marie MD    Indications:  Pain and osteoarthritis  Needle Size:  21 G  Guidance: landmark guided    Approach:  Medial  Location:  Knee      Medications:  40 mg triamcinolone 40 MG/ML; 9 mL lidocaine (PF) 1 %  Outcome:  Tolerated well, no immediate complications  Procedure discussed: discussed risks, benefits, and alternatives    Consent Given by:  Patient  Timeout: timeout called immediately prior to procedure    Prep: patient was prepped and draped in usual sterile fashion     HISTORY OF PRESENT ILLNESS:  Jordana Kern is a 23 year old female with knee pain.  Diagnosis is knee arthritis supported by history, physical exam, and imaging.    PROCEDURE:  After informed verbal and writtten consent, under sterile conditions, patient's right knee was injected with 9 cc of Lidocaine and 1 cc of Kenalog (40 mg/ml).   The injection was done by Dr. Marie.    Patient had good pain relief upon leaving the clinic, and will follow up with us on an as needed basis.      [facility] [address] [id number] [phone]  Dept: [phone]  ______________________________________________________________________________    Patient: Jordana Kern   : 2000   MRN: [id number]   2023    INVASIVE PROCEDURE SAFETY CHECKLIST    Date: 10/31/2023   Procedure:Right knee steroid injection  Patient Name: Jordana Kern  MRN: [id number]  YOB: 2000    Action: Complete sections as appropriate. Any discrepancy results in a HARD COPY until resolved.     PRE PROCEDURE:  Patient ID verified with 2 identifiers (name and  or MRN): Yes  Procedure and site verified with patient/designee (when able): Yes  Accurate consent documentation in medical record: Yes  H&P (or appropriate assessment)  documented in medical record: NA  H&P must be up to 20 days prior to procedure and updates within 24 hours of procedure as applicable: NA  Relevant diagnostic and radiology test results appropriately labeled and displayed as applicable: Yes  Procedure site(s) marked with provider initials: NA    TIMEOUT:  Time-Out performed immediately prior to starting procedure, including verbal and active participation of all team members addressing the following:Yes  * Correct patient identify  * Confirmed that the correct side and site are marked  * An accurate procedure consent form  * Agreement on the procedure to be done  * Correct patient position  * Relevant images and results are properly labeled and appropriately displayed  * The need to administer antibiotics or fluids for irrigation purposes during the procedure as applicable   * Safety precautions based on patient history or medication use    DURING PROCEDURE: Verification of correct person, site, and procedures any time the responsibility for care of the patient is transferred to another member of the care team.     The following medications were given:     Prior to injection, verified patient identity using patient's name and date of birth.  Due to injection administration, patient instructed to remain in clinic for 15 minutes  afterwards, and to report any adverse reaction to me immediately.    Joint injection was performed.    Medication Name: Lidocaine NDC 42037-620-27  Drug Amount Wasted:  Yes: 11 mg/ml   Vial/Syringe: Single dose vial  Expiration Date:  1/2025    Medication Name Kenolog NDC [address]-4    Scribed by Lucero Linn ATC for Dr. Marie on October 31, 2023 at 10:55a based on the provider's statements to me.     MAX Sorenson MD  Professor Orthopedic Surgery  [facility]

## 2023-10-31 NOTE — NURSING NOTE
Reason For Visit:   Chief Complaint   Patient presents with    RECHECK     DOS: 4/13/23 // Medial patellofemoral ligament reconstuction with allograft,arthroscopy, trochleoplast, possible lateral retinacular lengthening       Primary MD: No Ref-Primary, Physician  Ref. MD: Est    ?  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  Currently working? Yes.  Work status?  Full time.    Date of injury: Went to a wedding and was dancing in October 2022 and her knee went out.  Since then it has been monthly     Date of surgery: 4/13/2023  Type of surgery:   1. Right knee exam under anesthesia.   2. Diagnostic arthroscopy with patella chondroplasty  3. Trochleoplasty.   4. Lateral retinacular lengthening  6. Medial patellofemoral ligament reconstruction using gracilis allograft.  7. ROBEL cartilage biopsy     Smoker: No  Request smoking cessation information: No      There were no vitals taken for this visit.    Pain Assessment  Patient Currently in Pain: Jordan Diallo LPN

## 2024-05-16 ENCOUNTER — TELEPHONE (OUTPATIENT)
Dept: ORTHOPEDICS | Facility: CLINIC | Age: 24
End: 2024-05-16
Payer: COMMERCIAL

## 2024-05-16 NOTE — TELEPHONE ENCOUNTER
Left Voicemail (1st Attempt) and Sent Mychart (1st Attempt) for the patient to call back and schedule the following:    Appointment type: Return knee or video visit return  Provider: Dr. Marie  Return date: 6/4 in a 20 MINUTE appt spot ok per Lucero COLON

## 2024-05-21 NOTE — TELEPHONE ENCOUNTER
Left Voicemail (2nd Attempt) and Sent Mychart (2nd Attempt) for the patient to call back and schedule the following:    Appointment type: Return knee or video visit return  Provider: Dr. Marie  Return date: 6/4 in a 20 MINUTE appt spot ok per Lucero COLON

## 2024-09-07 ENCOUNTER — HEALTH MAINTENANCE LETTER (OUTPATIENT)
Age: 24
End: 2024-09-07

## (undated) DEVICE — TOURNIQUET CUFF 30" REPRO BLUE 60-7070-105

## (undated) DEVICE — SOL NACL 0.9% IRRIG 3000ML BAG 2B7477

## (undated) DEVICE — COVER CAMERA IN-LIGHT DISP LT-C02

## (undated) DEVICE — Device

## (undated) DEVICE — SU VICRYL 1 CT-1 36" J347H

## (undated) DEVICE — ADH SKIN CLOSURE PREMIERPRO EXOFIN 1.0ML 3470

## (undated) DEVICE — BNDG SPANDAGRIP SZ G LF BEIGE 4.5" SAG13145

## (undated) DEVICE — SU ETHIBOND 1 CT-1 30" X425H

## (undated) DEVICE — SU VICRYL 1 CT-2 27" J335H

## (undated) DEVICE — DRSG TEGADERM 4X10" 1627

## (undated) DEVICE — SU VICRYL 0 CT-2 27" J334H

## (undated) DEVICE — SU PDS II 1 CTX 36" Z371T

## (undated) DEVICE — BLADE SAW SAGITTAL STRK 34.5X11.5X0.64MM 2108-148-000S8

## (undated) DEVICE — ESU GROUND PAD ADULT W/CORD E7507

## (undated) DEVICE — SU VICRYL 2-0 CT-2 27" UND J269H

## (undated) DEVICE — STRAP KNEE/BODY 31143004

## (undated) DEVICE — LINEN ORTHO PACK 5446

## (undated) DEVICE — GLOVE GAMMEX NEOPRENE ULTRA SZ 6.5 LF 8513

## (undated) DEVICE — LINEN TOWEL PACK X5 5464

## (undated) DEVICE — PACK ACL SUPPLEMENT STD

## (undated) DEVICE — SOL WATER IRRIG 1000ML BOTTLE 2F7114

## (undated) DEVICE — DRSG TEGADERM ALGINATE AG 4X5" 90303

## (undated) DEVICE — BNDG ESMARK 6" STERILE LF 820-3612

## (undated) DEVICE — SU SILK 0 TIE 6X30" A306H

## (undated) DEVICE — SPONGE RAY-TEC 4X8" 7318

## (undated) DEVICE — DRAPE C-ARM W/STRAPS 42X72" 07-CA104

## (undated) DEVICE — SU MONOCRYL 3-0 PS-1 27" Y936H

## (undated) DEVICE — DRSG STERI STRIP 1/2X4" R1547

## (undated) DEVICE — DRAPE TIBURON TOP SHEET 100X60" 29352

## (undated) DEVICE — ESU PENCIL W/SMOKE EVAC NEPTUNE STRYKER 0703-046-000

## (undated) DEVICE — SU NDL MCGOWAN 1/2 1859-6D

## (undated) DEVICE — TUBING ARTHROSCOPY PUMP ARTHREX AR-6410

## (undated) DEVICE — SOL NACL 0.9% IRRIG 1000ML BOTTLE 2F7124

## (undated) DEVICE — SUCTION MANIFOLD NEPTUNE 2 SYS 4 PORT 0702-020-000

## (undated) DEVICE — GLOVE BIOGEL PI MICRO SZ 6.5 48565

## (undated) DEVICE — BUR ARTHREX COOLCUT SABRE 4.0MMX13CM AR-8400SR

## (undated) DEVICE — BLADE KNIFE SURG 20 371120

## (undated) DEVICE — SU ETHILON 3-0 PS-1 18" 1663H

## (undated) DEVICE — SU VICRYL 1 CT-1 27" UND J261H

## (undated) DEVICE — SPONGE LAP 18X18" X8435

## (undated) DEVICE — PIN STEINMAN 2.0MM 5/64X9" SGL END TROCAR

## (undated) DEVICE — SU LOOP #2 FIBERLOOP  AR-7234

## (undated) RX ORDER — ONDANSETRON 2 MG/ML
INJECTION INTRAMUSCULAR; INTRAVENOUS
Status: DISPENSED
Start: 2023-04-13

## (undated) RX ORDER — PROPOFOL 10 MG/ML
INJECTION, EMULSION INTRAVENOUS
Status: DISPENSED
Start: 2023-04-13

## (undated) RX ORDER — TRANEXAMIC ACID 650 MG/1
TABLET ORAL
Status: DISPENSED
Start: 2023-04-13

## (undated) RX ORDER — EPHEDRINE SULFATE 50 MG/ML
INJECTION, SOLUTION INTRAMUSCULAR; INTRAVENOUS; SUBCUTANEOUS
Status: DISPENSED
Start: 2023-04-13

## (undated) RX ORDER — FENTANYL CITRATE 50 UG/ML
INJECTION, SOLUTION INTRAMUSCULAR; INTRAVENOUS
Status: DISPENSED
Start: 2023-04-13

## (undated) RX ORDER — GLYCOPYRROLATE 0.2 MG/ML
INJECTION INTRAMUSCULAR; INTRAVENOUS
Status: DISPENSED
Start: 2023-04-13

## (undated) RX ORDER — BUPIVACAINE HYDROCHLORIDE AND EPINEPHRINE 2.5; 5 MG/ML; UG/ML
INJECTION, SOLUTION EPIDURAL; INFILTRATION; INTRACAUDAL; PERINEURAL
Status: DISPENSED
Start: 2023-04-13

## (undated) RX ORDER — TRIAMCINOLONE ACETONIDE 40 MG/ML
INJECTION, SUSPENSION INTRA-ARTICULAR; INTRAMUSCULAR
Status: DISPENSED
Start: 2023-10-31

## (undated) RX ORDER — BUPIVACAINE HYDROCHLORIDE 2.5 MG/ML
INJECTION, SOLUTION EPIDURAL; INFILTRATION; INTRACAUDAL
Status: DISPENSED
Start: 2023-04-13

## (undated) RX ORDER — OXYCODONE HYDROCHLORIDE 5 MG/1
TABLET ORAL
Status: DISPENSED
Start: 2023-04-13

## (undated) RX ORDER — LIDOCAINE HYDROCHLORIDE 10 MG/ML
INJECTION, SOLUTION INFILTRATION; PERINEURAL
Status: DISPENSED
Start: 2023-10-31

## (undated) RX ORDER — CEFAZOLIN SODIUM/WATER 2 G/20 ML
SYRINGE (ML) INTRAVENOUS
Status: DISPENSED
Start: 2023-04-13

## (undated) RX ORDER — ACETAMINOPHEN 325 MG/1
TABLET ORAL
Status: DISPENSED
Start: 2023-04-13